# Patient Record
Sex: MALE | Race: WHITE | Employment: OTHER | URBAN - NONMETROPOLITAN AREA
[De-identification: names, ages, dates, MRNs, and addresses within clinical notes are randomized per-mention and may not be internally consistent; named-entity substitution may affect disease eponyms.]

---

## 2020-02-11 PROCEDURE — 64488 TAP BLOCK BI INJECTION: CPT | Performed by: NURSE ANESTHETIST, CERTIFIED REGISTERED

## 2021-02-10 ENCOUNTER — ANESTHESIA EVENT (OUTPATIENT)
Dept: OPERATING ROOM | Age: 61
DRG: 343 | End: 2021-02-10
Payer: COMMERCIAL

## 2021-02-10 ENCOUNTER — HOSPITAL ENCOUNTER (INPATIENT)
Age: 61
LOS: 2 days | Discharge: HOME | DRG: 343 | End: 2021-02-12
Attending: SURGERY | Admitting: SURGERY
Payer: COMMERCIAL

## 2021-02-10 ENCOUNTER — APPOINTMENT (OUTPATIENT)
Dept: CT IMAGING | Age: 61
DRG: 343 | End: 2021-02-10
Payer: COMMERCIAL

## 2021-02-10 DIAGNOSIS — G89.18 ACUTE POSTOPERATIVE PAIN: ICD-10-CM

## 2021-02-10 DIAGNOSIS — K35.80 ACUTE APPENDICITIS, UNSPECIFIED ACUTE APPENDICITIS TYPE: Primary | ICD-10-CM

## 2021-02-10 LAB
-: ABNORMAL
ABSOLUTE EOS #: 0.17 K/UL (ref 0–0.44)
ABSOLUTE IMMATURE GRANULOCYTE: 0.04 K/UL (ref 0–0.3)
ABSOLUTE LYMPH #: 1.87 K/UL (ref 1.1–3.7)
ABSOLUTE MONO #: 0.41 K/UL (ref 0.1–1.2)
ALBUMIN SERPL-MCNC: 4.8 G/DL (ref 3.5–5.2)
ALBUMIN/GLOBULIN RATIO: 1.7 (ref 1–2.5)
ALP BLD-CCNC: 48 U/L (ref 40–129)
ALT SERPL-CCNC: 22 U/L (ref 5–41)
AMORPHOUS: ABNORMAL
AMYLASE: 74 U/L (ref 28–100)
ANION GAP SERPL CALCULATED.3IONS-SCNC: 11 MMOL/L (ref 9–17)
AST SERPL-CCNC: 19 U/L
BACTERIA: ABNORMAL
BASOPHILS # BLD: 0 % (ref 0–2)
BASOPHILS ABSOLUTE: 0.04 K/UL (ref 0–0.2)
BILIRUB SERPL-MCNC: 0.46 MG/DL (ref 0.3–1.2)
BILIRUBIN URINE: NEGATIVE
BUN BLDV-MCNC: 19 MG/DL (ref 6–20)
BUN/CREAT BLD: 19 (ref 9–20)
CALCIUM SERPL-MCNC: 9.5 MG/DL (ref 8.6–10.4)
CASTS UA: ABNORMAL /LPF
CHLORIDE BLD-SCNC: 101 MMOL/L (ref 98–107)
CO2: 25 MMOL/L (ref 20–31)
COLOR: YELLOW
COMMENT UA: ABNORMAL
CREAT SERPL-MCNC: 1 MG/DL (ref 0.7–1.2)
CRYSTALS, UA: ABNORMAL /HPF
DIFFERENTIAL TYPE: ABNORMAL
EOSINOPHILS RELATIVE PERCENT: 1 % (ref 1–4)
EPITHELIAL CELLS UA: ABNORMAL /HPF (ref 0–5)
GFR AFRICAN AMERICAN: >60 ML/MIN
GFR NON-AFRICAN AMERICAN: >60 ML/MIN
GFR SERPL CREATININE-BSD FRML MDRD: ABNORMAL ML/MIN/{1.73_M2}
GFR SERPL CREATININE-BSD FRML MDRD: ABNORMAL ML/MIN/{1.73_M2}
GLUCOSE BLD-MCNC: 162 MG/DL (ref 70–99)
GLUCOSE URINE: NEGATIVE
HCT VFR BLD CALC: 47.2 % (ref 40.7–50.3)
HEMOGLOBIN: 15.8 G/DL (ref 13–17)
IMMATURE GRANULOCYTES: 0 %
KETONES, URINE: NEGATIVE
LACTIC ACID, WHOLE BLOOD: NORMAL MMOL/L (ref 0.7–2.1)
LACTIC ACID: 1.7 MMOL/L (ref 0.5–2.2)
LEUKOCYTE ESTERASE, URINE: NEGATIVE
LIPASE: 41 U/L (ref 13–60)
LYMPHOCYTES # BLD: 14 % (ref 24–43)
MCH RBC QN AUTO: 30 PG (ref 25.2–33.5)
MCHC RBC AUTO-ENTMCNC: 33.5 G/DL (ref 28.4–34.8)
MCV RBC AUTO: 89.7 FL (ref 82.6–102.9)
MONOCYTES # BLD: 3 % (ref 3–12)
MUCUS: ABNORMAL
NITRITE, URINE: NEGATIVE
NRBC AUTOMATED: 0 PER 100 WBC
OTHER OBSERVATIONS UA: ABNORMAL
PDW BLD-RTO: 13.7 % (ref 11.8–14.4)
PH UA: 5.5 (ref 5–9)
PLATELET # BLD: 175 K/UL (ref 138–453)
PLATELET ESTIMATE: ABNORMAL
PMV BLD AUTO: 9.9 FL (ref 8.1–13.5)
POTASSIUM SERPL-SCNC: 4.2 MMOL/L (ref 3.7–5.3)
PROTEIN UA: NEGATIVE
RBC # BLD: 5.26 M/UL (ref 4.21–5.77)
RBC # BLD: ABNORMAL 10*6/UL
RBC UA: ABNORMAL /HPF (ref 0–2)
RENAL EPITHELIAL, UA: ABNORMAL /HPF
SARS-COV-2, RAPID: NOT DETECTED
SARS-COV-2: NORMAL
SARS-COV-2: NORMAL
SEG NEUTROPHILS: 82 % (ref 36–65)
SEGMENTED NEUTROPHILS ABSOLUTE COUNT: 10.48 K/UL (ref 1.5–8.1)
SODIUM BLD-SCNC: 137 MMOL/L (ref 135–144)
SOURCE: NORMAL
SPECIFIC GRAVITY UA: 1.02 (ref 1.01–1.02)
TOTAL PROTEIN: 7.7 G/DL (ref 6.4–8.3)
TRICHOMONAS: ABNORMAL
TURBIDITY: CLEAR
URINE HGB: NEGATIVE
UROBILINOGEN, URINE: NORMAL
WBC # BLD: 13 K/UL (ref 3.5–11.3)
WBC # BLD: ABNORMAL 10*3/UL
WBC UA: ABNORMAL /HPF (ref 0–5)
YEAST: ABNORMAL

## 2021-02-10 PROCEDURE — 99222 1ST HOSP IP/OBS MODERATE 55: CPT | Performed by: SURGERY

## 2021-02-10 PROCEDURE — 80053 COMPREHEN METABOLIC PANEL: CPT

## 2021-02-10 PROCEDURE — 74177 CT ABD & PELVIS W/CONTRAST: CPT

## 2021-02-10 PROCEDURE — C9803 HOPD COVID-19 SPEC COLLECT: HCPCS

## 2021-02-10 PROCEDURE — 83605 ASSAY OF LACTIC ACID: CPT

## 2021-02-10 PROCEDURE — 81001 URINALYSIS AUTO W/SCOPE: CPT

## 2021-02-10 PROCEDURE — 0DTJ4ZZ RESECTION OF APPENDIX, PERCUTANEOUS ENDOSCOPIC APPROACH: ICD-10-PCS | Performed by: SURGERY

## 2021-02-10 PROCEDURE — 36415 COLL VENOUS BLD VENIPUNCTURE: CPT

## 2021-02-10 PROCEDURE — 8E0W4CZ ROBOTIC ASSISTED PROCEDURE OF TRUNK REGION, PERCUTANEOUS ENDOSCOPIC APPROACH: ICD-10-PCS | Performed by: SURGERY

## 2021-02-10 PROCEDURE — 6360000002 HC RX W HCPCS: Performed by: SURGERY

## 2021-02-10 PROCEDURE — 6360000004 HC RX CONTRAST MEDICATION: Performed by: NURSE PRACTITIONER

## 2021-02-10 PROCEDURE — 96365 THER/PROPH/DIAG IV INF INIT: CPT

## 2021-02-10 PROCEDURE — 85025 COMPLETE CBC W/AUTO DIFF WBC: CPT

## 2021-02-10 PROCEDURE — 1200000000 HC SEMI PRIVATE

## 2021-02-10 PROCEDURE — G0378 HOSPITAL OBSERVATION PER HR: HCPCS

## 2021-02-10 PROCEDURE — 83690 ASSAY OF LIPASE: CPT

## 2021-02-10 PROCEDURE — 2580000003 HC RX 258: Performed by: SURGERY

## 2021-02-10 PROCEDURE — 6360000002 HC RX W HCPCS: Performed by: NURSE PRACTITIONER

## 2021-02-10 PROCEDURE — 99285 EMERGENCY DEPT VISIT HI MDM: CPT

## 2021-02-10 PROCEDURE — 2580000003 HC RX 258: Performed by: NURSE PRACTITIONER

## 2021-02-10 PROCEDURE — U0002 COVID-19 LAB TEST NON-CDC: HCPCS

## 2021-02-10 PROCEDURE — 87086 URINE CULTURE/COLONY COUNT: CPT

## 2021-02-10 PROCEDURE — 96375 TX/PRO/DX INJ NEW DRUG ADDON: CPT

## 2021-02-10 PROCEDURE — 96376 TX/PRO/DX INJ SAME DRUG ADON: CPT

## 2021-02-10 PROCEDURE — 82150 ASSAY OF AMYLASE: CPT

## 2021-02-10 RX ORDER — SODIUM CHLORIDE 9 MG/ML
INJECTION, SOLUTION INTRAVENOUS CONTINUOUS
Status: DISCONTINUED | OUTPATIENT
Start: 2021-02-10 | End: 2021-02-11

## 2021-02-10 RX ORDER — KETOROLAC TROMETHAMINE 15 MG/ML
15 INJECTION, SOLUTION INTRAMUSCULAR; INTRAVENOUS 2 TIMES DAILY PRN
Status: DISCONTINUED | OUTPATIENT
Start: 2021-02-10 | End: 2021-02-11

## 2021-02-10 RX ORDER — ONDANSETRON 2 MG/ML
4 INJECTION INTRAMUSCULAR; INTRAVENOUS EVERY 6 HOURS PRN
Status: DISCONTINUED | OUTPATIENT
Start: 2021-02-10 | End: 2021-02-11

## 2021-02-10 RX ORDER — 0.9 % SODIUM CHLORIDE 0.9 %
1000 INTRAVENOUS SOLUTION INTRAVENOUS ONCE
Status: COMPLETED | OUTPATIENT
Start: 2021-02-10 | End: 2021-02-10

## 2021-02-10 RX ORDER — MORPHINE SULFATE 2 MG/ML
2 INJECTION, SOLUTION INTRAMUSCULAR; INTRAVENOUS
Status: DISCONTINUED | OUTPATIENT
Start: 2021-02-10 | End: 2021-02-11

## 2021-02-10 RX ORDER — ONDANSETRON 2 MG/ML
4 INJECTION INTRAMUSCULAR; INTRAVENOUS ONCE
Status: COMPLETED | OUTPATIENT
Start: 2021-02-10 | End: 2021-02-10

## 2021-02-10 RX ADMIN — ONDANSETRON 4 MG: 2 INJECTION INTRAMUSCULAR; INTRAVENOUS at 13:56

## 2021-02-10 RX ADMIN — SODIUM CHLORIDE: 9 INJECTION, SOLUTION INTRAVENOUS at 18:46

## 2021-02-10 RX ADMIN — PIPERACILLIN AND TAZOBACTAM 3375 MG: 3; .375 INJECTION, POWDER, FOR SOLUTION INTRAVENOUS at 23:34

## 2021-02-10 RX ADMIN — DEXTROSE MONOHYDRATE 4500 MG: 50 INJECTION, SOLUTION INTRAVENOUS at 15:45

## 2021-02-10 RX ADMIN — IOPAMIDOL 75 ML: 755 INJECTION, SOLUTION INTRAVENOUS at 14:27

## 2021-02-10 RX ADMIN — SODIUM CHLORIDE 1000 ML: 9 INJECTION, SOLUTION INTRAVENOUS at 13:56

## 2021-02-10 RX ADMIN — KETOROLAC TROMETHAMINE 15 MG: 15 INJECTION, SOLUTION INTRAMUSCULAR; INTRAVENOUS at 21:02

## 2021-02-10 ASSESSMENT — PAIN DESCRIPTION - LOCATION
LOCATION: ABDOMEN
LOCATION: ABDOMEN

## 2021-02-10 ASSESSMENT — PAIN SCALES - GENERAL
PAINLEVEL_OUTOF10: 7
PAINLEVEL_OUTOF10: 8
PAINLEVEL_OUTOF10: 3
PAINLEVEL_OUTOF10: 4

## 2021-02-10 ASSESSMENT — PAIN DESCRIPTION - ORIENTATION
ORIENTATION: RIGHT;LOWER

## 2021-02-10 ASSESSMENT — PAIN DESCRIPTION - FREQUENCY
FREQUENCY: CONTINUOUS
FREQUENCY: CONTINUOUS

## 2021-02-10 ASSESSMENT — PAIN DESCRIPTION - PAIN TYPE
TYPE: ACUTE PAIN
TYPE: ACUTE PAIN

## 2021-02-10 NOTE — ED NOTES
Pt ambulated to the bathroom and instructed on how to obtain a clean catch urine. Pt will take specimen back to room for collection and labeling. Pt will pull call light if assistance is needed.       Freeman Hurtado RN  02/10/21 4047

## 2021-02-10 NOTE — H&P
GENERAL SURGERY H&P- Inpatient      Patient's Name/ Date of Birth/ Gender: Susannah Marquez / 1960 (62 y.o.) / male     PCP: Pedro Corea MD  Referring: ER    History of present Illness:  Patient is a pleasant 62 y.o. male   ER admission. Acute appendicitis    Past Medical History:  has a past medical history of Depression, Diabetes mellitus ([address] Hyperlipidemia, and Hypertension. Past Surgical History:   Past Surgical History:   Procedure Laterality Date    TONSILLECTOMY      WISDOM TOOTH EXTRACTION         Social History:  reports that he quit smoking about 4 years ago. His smoking use included cigarettes. He has a 5.00 pack-year smoking history. He has never used smokeless tobacco. He reports that he does not drink alcohol or use drugs. Family History: family history includes Heart Disease in his father.     Review of Systems:   General: Completed and, except as mentioned above, was negative or noncontributory  Psychological:  Completed and, except as mentioned above, was negative or noncontributory  Ophthalmic:  Completed and, except as mentioned above, was negative or noncontributory  ENT:  Completed and, except as mentioned above, was negative or noncontributory  Allergy and Immunology:  Completed and, except as mentioned above, was negative or noncontributory  Hematological and Lymphatic:  Completed and, except as mentioned above, was negative or noncontributory  Endocrine: Completed and, except as mentioned above, was negative or noncontributory  Breast:  Completed and, except as mentioned above, was negative or noncontributory  Respiratory:  Completed and, except as mentioned above, was negative or noncontributory  Cardiovascular:  Completed and, except as mentioned above, was negative or noncontributory  Gastrointestinal: Completed and, except as mentioned above, was negative or noncontributory  Genito-Urinary:  Completed and, except as mentioned above, was negative or cooperative. HEENT: PERRLA, EOMI, no scleral icterus  Neck:  no goiter  CVS: Regular rate and rhythm  Resp: Good bilateral air entry, no active wheezing, no labored breathing  Abd: soft, + localized RLQ peritonitis, + rebound, + guarding  Ext: Moves all extremities, no gross focal motor deficits  Skin: No erythema or ulcerations     Labs:   Lab Results   Component Value Date    WBC 13.0 02/10/2021    HGB 15.8 02/10/2021    HCT 47.2 02/10/2021    MCV 89.7 02/10/2021     02/10/2021     Lab Results   Component Value Date     02/10/2021    K 4.2 02/10/2021     02/10/2021    CO2 25 02/10/2021    BUN 19 02/10/2021    CREATININE 1.00 02/10/2021    GLUCOSE 162 02/10/2021    GLUCOSE 135 12/16/2020    CALCIUM 9.5 02/10/2021     Lab Results   Component Value Date    ALKPHOS 48 02/10/2021    ALT 22 02/10/2021    AST 19 02/10/2021    PROT 7.7 02/10/2021    BILITOT 0.46 02/10/2021    LABALBU 4.8 02/10/2021     Lab Results   Component Value Date    AMYLASE 74 02/10/2021     Lab Results   Component Value Date    LIPASE 41 02/10/2021     No results found for: INR    Radiologic Studies:  EXAMINATION:   CT OF THE ABDOMEN AND PELVIS WITH CONTRAST 2/10/2021 11:21 am       TECHNIQUE:   CT of the abdomen and pelvis was performed with the administration of   intravenous contrast. Multiplanar reformatted images are provided for review.    Dose modulation, iterative reconstruction, and/or weight based adjustment of   the mA/kV was utilized to reduce the radiation dose to as low as reasonably   achievable.       COMPARISON:   None.       HISTORY:   ORDERING SYSTEM PROVIDED HISTORY: RLQ abd pain   TECHNOLOGIST PROVIDED HISTORY:   RLQ abd pain           FINDINGS:   Lower Chest: Mild scarring inferior segment lingula.  Trace dependent   changes.  No pleural or pericardial effusion.  Tiny hiatal hernia.       Organs:  Liver enhances normally without evidence of intrahepatic biliary   ductal dilatation.  Cholecystectomy. Santhosh Castaneda is within normal limits with a   small splenule anteriorly.  Pancreas, adrenal glands, and kidneys are within   normal limits.       GI/Bowel: Enlarged appendix measuring 10 mm with julianna appendiceal   inflammatory change.  No appendicoliths and no surrounding free air or   organized periappendiceal fluid collections.  Associated secondary   inflammatory thickening of the tip of the cecum. There is diverticulosis   without evidence of diverticulitis.       Pelvis: Urinary bladder and male pelvic organs are grossly unremarkable.       Peritoneum/Retroperitoneum: Julianna appendiceal and right paracolic gutter   inflammatory fat stranding.  Small volume of complex free fluid in the   dependent pelvis.       Bones/Soft Tissues: No acute abnormality in the superficial soft tissues or   bones on a background of mild degenerative changes.           Impression   Acute appendicitis.  No organized periappendiceal collections or free air.       Secondary inflammatory thickening of the tip of the cecum.       Inflammatory changes along the right paracolic gutter with a trace volume of   complex free fluid in the dependent pelvis. Impressions/Recommendations:     Acute appendicitis with localized peritonitis. IV antibiotics. NPO. Scheduled for surgery in am.   Informed consent/risks benefits discussed for robotic/laparoscopic appendectomy, possible open, possible, drain. Wife present. H&P  General Surgery        Pt Name: Benny Lauren  MRN: [id number]  YOB: 1960  Date of evaluation: 2/11/2021      [x] I have examined the patient and reviewed the H&P/Consult completed, and there are no changes to the patient or plans. [] I have examined the patient and reviewed the H&P/Consult and have noted the following changes: The patient was counseled at length about the risks of beverly Covid-19 during their perioperative period and any recovery window from their procedure.   The patient was made aware that beverly Covid-19  may worsen their prognosis for recovering from their procedure  and lend to a higher morbidity and/or mortality risk. All material risks, benefits, and reasonable alternatives including postponing the procedure were discussed. The patient does wish to proceed with the procedure at this time.         Electronically signed by Favian Muniz DO  on 2/11/2021 at 9:05 AM

## 2021-02-10 NOTE — PROGRESS NOTES
Pt admitted from ER d/t RLQ pain. Transported by wheelchair, accompanied by wife. Sharon Hwang RN, gives report. Pt is A&Ox4, calm and cooperative, rates pain 4/10 to RLQ and lower abdomen/umbilical area. Surgery planned for tomorrow. Admission assmt to be completed. Will continue to monitor.

## 2021-02-10 NOTE — ED PROVIDER NOTES
[facility] ED  EMERGENCY DEPARTMENT ENCOUNTER      Pt Name: Kathrine White  MRN: [id number]  [place] 1960  Date of evaluation: 2/10/2021  Provider: SANDY Bartlett [id number]       Chief Complaint   Patient presents with    Abdominal Pain     RLQ and mid lower abd pain, onset this am at 0930         HISTORY OF PRESENT ILLNESS   (Location/Symptom, Timing/Onset, Context/Setting, Quality, Duration, Modifying Factors, Severity)  Note limiting factors. Kathrine White is a 62 y.o. male who presents to the emergency department for a complaint of right lower quadrant abdominal pain to the mid abdominal pain. The patient reports that his pain started this morning at approximately 9:30 AM, 4 hours ago. He rates his pain an 8 out of a 10 on the pain scale. He states he is concerned about appendicitis. He denies fevers or chills. He denies any nausea or vomiting but reports he does have slightly loose stools but has had this since a recent diet change. Nursing Notes were reviewed. REVIEW OF SYSTEMS    (2-9 systems for level 4, 10 or more for level 5)   Constitutional: Negative for fever, chills  Skin: Negative for rash, itching  HENT: Negative for sore throat, rhinorrhea and sinus pain. Eyes: Negative for eye discharge, eye redness  Cardiovascular: Negative for chest pain, dyspnea on exertion  Respiratory: Negative for cough, shortness of breath, wheezing or stridor. Gastrointestinal: See HPI  Musculoskeletal: Negative for myalgias, back pain, falls. Neurological: Negative for tingling, headaches. Except as noted above the remainder of the review of systems was reviewed and negative.        PAST MEDICAL HISTORY     Past Medical History:   Diagnosis Date    Depression     Diabetes mellitus ([address]     Hyperlipidemia     Hypertension          SURGICAL HISTORY       Past Surgical History:   Procedure Laterality Date    TONSILLECTOMY           CURRENT MEDICATIONS Previous Medications    FENOFIBRATE (TRIGLIDE) 160 MG TABLET    Take 1 tablet by mouth daily    GLUCOSE BLOOD VI TEST STRIPS (ASCENSIA AUTODISC VI;ONE TOUCH ULTRA TEST VI) STRIP    1 each by In Vitro route 2 times daily. Pt uses one touch ultra test strips/ pt tests bid/ disp: 3 month supply /Dx: 250.00/272.2/401.1    HYDROCHLOROTHIAZIDE (MICROZIDE) 12.5 MG CAPSULE    Take 1 capsule by mouth every morning    LOSARTAN (COZAAR) 100 MG TABLET    Take 1 tablet by mouth daily    METFORMIN (GLUCOPHAGE) 1000 MG TABLET    Take 1 tablet by mouth 2 times daily (with meals)    PRAVASTATIN (PRAVACHOL) 40 MG TABLET    Take 1 tablet by mouth daily       ALLERGIES     Patient has no known allergies.     FAMILY HISTORY       Family History   Problem Relation Age of Onset    Heart Disease Father           SOCIAL HISTORY       Social History     Socioeconomic History    Marital status:      Spouse name: None    Number of children: None    Years of education: None    Highest education level: None   Occupational History    None   Social Needs    Financial resource strain: Not hard at all   [employer] insecurity     Worry: Never true     Inability: Never true    Transportation needs     Medical: No     Non-medical: No   Tobacco Use    Smoking status: Former Smoker     Packs/day: 0.50     Years: 10.00     Pack years: 5.00     Types: Cigarettes     Quit date: 10/1/2016     Years since quittin.3    Smokeless tobacco: Never Used   Substance and Sexual Activity    Alcohol use: No     Alcohol/week: 0.0 standard drinks    Drug use: None    Sexual activity: None   Lifestyle    Physical activity     Days per week: None     Minutes per session: None    Stress: None   Relationships    Social connections     Talks on phone: None     Gets together: None     Attends [employer] service: None     Active member of club or organization: None     Attends meetings of clubs or organizations: None     Relationship status: None    Intimate partner violence     Fear of current or ex partner: None     Emotionally abused: None     Physically abused: None     Forced sexual activity: None   Other Topics Concern    None   Social History Narrative    None       PHYSICAL EXAM    (up to 7 for level 4, 8 or more for level 5)     ED Triage Vitals   BP Temp Temp Source Pulse Resp SpO2 Height Weight   02/10/21 1254 02/10/21 1305 02/10/21 1305 -- 02/10/21 1305 02/10/21 1255 -- --   (!) 152/66 97.5 °F (36.4 °C) Tympanic  16 98 %       Constitutional: Oriented and well-developed, well-nourished, and in no distress. Non-toxic appearance. Head: Normocephalic and atraumatic. Eyes: Extra ocular muscles intact. Pupils are equal, round, and reactive to light. No discharge. No scleral icterus. Neck: Normal range of motion and phonation normal. Neck supple. No JVD present. No spinous process tenderness and no muscular tenderness present. No cervical adenopathy. Cardiovascular: Regular rate and rhythm, normal heart sounds and intact distal pulses. No murmur heard. Pulmonary/Chest: Effort normal and breath sounds normal. No accessory muscle usage or stridor. Not tachypneic. No respiratory distress. No tenderness and no retraction. Abdominal: Soft and non-distended. Bowel sounds are normal. No masses or organomegaly. Mild tenderness without guarding or rebound noted in the right lower quadrant. Negative heel strike. Musculoskeletal: Normal range of motion. No edema and no tenderness. Neurological: Alert and oriented. Skin: Skin is warm and dry. No rash noted. No cyanosis or erythema. No pallor. Nails show no clubbing.      DIAGNOSTIC RESULTS     EKG: All EKG's are interpreted by the Emergency Department Physician who either signs or Co-signs this chart in the absence of a cardiologist.    RADIOLOGY:   Non-plain film images such as CT, Ultrasound and MRI are read by the radiologist. Plain radiographic images are visualized and preliminarily interpreted by the emergency physician with the below findings:    Interpretation per the Radiologist below, if available at the time of this note:    CT ABDOMEN PELVIS W IV CONTRAST Additional Contrast? None   Final Result   Acute appendicitis. No organized periappendiceal collections or free air. Secondary inflammatory thickening of the tip of the cecum. Inflammatory changes along the right paracolic gutter with a trace volume of   complex free fluid in the dependent pelvis.                ED BEDSIDE ULTRASOUND:   Performed by ED Physician - none    LABS:  Results for orders placed or performed during the hospital encounter of 02/10/21   CBC Auto Differential   Result Value Ref Range    WBC 13.0 (H) 3.5 - 11.3 k/uL    RBC 5.26 4.21 - 5.77 m/uL    Hemoglobin 15.8 13.0 - 17.0 g/dL    Hematocrit 47.2 40.7 - 50.3 %    MCV 89.7 82.6 - 102.9 fL    MCH 30.0 25.2 - 33.5 pg    MCHC 33.5 28.4 - 34.8 g/dL    RDW 13.7 11.8 - 14.4 %    Platelets 413 243 - 120 k/uL    MPV 9.9 8.1 - 13.5 fL    NRBC Automated 0.0 0.0 per 100 WBC    Differential Type NOT REPORTED     Seg Neutrophils 82 (H) 36 - 65 %    Lymphocytes 14 (L) 24 - 43 %    Monocytes 3 3 - 12 %    Eosinophils % 1 1 - 4 %    Basophils 0 0 - 2 %    Immature Granulocytes 0 0 %    Segs Absolute 10.48 (H) 1.50 - 8.10 k/uL    Absolute Lymph # 1.87 1.10 - 3.70 k/uL    Absolute Mono # 0.41 0.10 - 1.20 k/uL    Absolute Eos # 0.17 0.00 - 0.44 k/uL    Basophils Absolute 0.04 0.00 - 0.20 k/uL    Absolute Immature Granulocyte 0.04 0.00 - 0.30 k/uL    WBC Morphology NOT REPORTED     RBC Morphology NOT REPORTED     Platelet Estimate NOT REPORTED    Comprehensive Metabolic Panel   Result Value Ref Range    Glucose 162 (H) 70 - 99 mg/dL    BUN 19 6 - 20 mg/dL    CREATININE 1.00 0.70 - 1.20 mg/dL    Bun/Cre Ratio 19 9 - 20    Calcium 9.5 8.6 - 10.4 mg/dL    Sodium 137 135 - 144 mmol/L    Potassium 4.2 3.7 - 5.3 mmol/L    Chloride 101 98 - 107 mmol/L    CO2 25 20 - 31 mmol/L Anion Gap 11 9 - 17 mmol/L    Alkaline Phosphatase 48 40 - 129 U/L    ALT 22 5 - 41 U/L    AST 19 <40 U/L    Total Bilirubin 0.46 0.3 - 1.2 mg/dL    Total Protein 7.7 6.4 - 8.3 g/dL    Albumin 4.8 3.5 - 5.2 g/dL    Albumin/Globulin Ratio 1.7 1.0 - 2.5    GFR Non-African American >60 >60 mL/min    GFR African American >60 >60 mL/min    GFR Comment          GFR Staging         Lactic Acid, Plasma   Result Value Ref Range    Lactic Acid 1.7 0.5 - 2.2 mmol/L    Lactic Acid, Whole Blood NOT REPORTED 0.7 - 2.1 mmol/L   Lipase   Result Value Ref Range    Lipase 41 13 - 60 U/L   Urinalysis with Microscopic   Result Value Ref Range    Color, UA YELLOW YELLOW    Turbidity UA CLEAR CLEAR    Glucose, Ur NEGATIVE NEGATIVE    Bilirubin Urine NEGATIVE NEGATIVE    Ketones, Urine NEGATIVE NEGATIVE    Specific Gravity, UA 1.020 1.010 - 1.020    Urine Hgb NEGATIVE NEGATIVE    pH, UA 5.5 5.0 - 9.0    Protein, UA NEGATIVE NEGATIVE    Urobilinogen, Urine Normal Normal    Nitrite, Urine NEGATIVE NEGATIVE    Leukocyte Esterase, Urine NEGATIVE NEGATIVE    Urinalysis Comments NOT REPORTED     -          WBC, UA 0 TO 2 0 - 5 /HPF    RBC, UA None 0 - 2 /HPF    Casts UA NOT REPORTED /LPF    Crystals, UA NOT REPORTED None /HPF    Epithelial Cells UA 0 TO 2 0 - 5 /HPF    Renal Epithelial, UA NOT REPORTED 0 /HPF    Bacteria, UA NOT REPORTED None    Mucus, UA TRACE (A) None    Trichomonas, UA NOT REPORTED None    Amorphous, UA NOT REPORTED None    Other Observations UA NOT REPORTED NOT REQ.     Yeast, UA NOT REPORTED None   Amylase   Result Value Ref Range    Amylase 74 28 - 100 U/L     Orders Placed This Encounter   Procedures    Culture, Urine    CT ABDOMEN PELVIS W IV CONTRAST Additional Contrast? None    CBC Auto Differential    Comprehensive Metabolic Panel    Lactic Acid, Plasma    Lipase    Urinalysis with Microscopic    Amylase    Insert peripheral IV       All other labs were within normal range or not returned as of this dictation. EMERGENCY DEPARTMENT COURSE and DIFFERENTIAL DIAGNOSIS/MDM:   Vitals:    Vitals:    02/10/21 1254 02/10/21 1255 02/10/21 1305   BP: (!) 152/66     Resp:   16   Temp:   97.5 °F (36.4 °C)   TempSrc:   Tympanic   SpO2:  98%        MEDICAL DECISION MAKING:  Patient was admitted to general surgery, Dr. Jayden Muniz. He was started on antibiotics and general surgery stated that she would remove the appendix in the morning. The patient was evaluated during the global COVID-19 pandemic, and that diagnosis was considered upon their initial presentation. Their evaluation, treatment and testing was consistent with current guidelines for patients who present with complaints or symptoms that may be related to COVID-19. Full PPE including gloves, gown, N95 or P100 respirator, and eye protection was used during every encounter with this patient. CONSULTS:  Dr. Jayden Muniz - General Surgery -was notified about the acute appendicitis finding and she stated that the patient could be placed on antibiotics and she would remove the appendix in the morning first thing. She agreed to admit to her service. FINAL IMPRESSION      1. Acute appendicitis, unspecified acute appendicitis type Stable         DISPOSITION/PLAN   DISPOSITION    Admit    PATIENT REFERRED TO:  No follow-up provider specified. DISCHARGE MEDICATIONS:  New Prescriptions    No medications on file     Controlled Substances Monitoring:     No flowsheet data found.     (Please note that portions of this note were completed with a voice recognition program.  Efforts were made to edit the dictations but occasionally words are mis-transcribed.)    Electronically signed by SANDY Contreras CNP on 2/10/2021 at 3:12 PM               SANDY Contreras CNP  02/10/21 8075

## 2021-02-11 ENCOUNTER — ANESTHESIA (OUTPATIENT)
Dept: OPERATING ROOM | Age: 61
DRG: 343 | End: 2021-02-11
Payer: COMMERCIAL

## 2021-02-11 VITALS — SYSTOLIC BLOOD PRESSURE: 113 MMHG | DIASTOLIC BLOOD PRESSURE: 40 MMHG | OXYGEN SATURATION: 99 %

## 2021-02-11 LAB
CULTURE: NORMAL
GLUCOSE BLD-MCNC: 140 MG/DL (ref 74–100)
Lab: NORMAL
SPECIMEN DESCRIPTION: NORMAL

## 2021-02-11 PROCEDURE — 2500000003 HC RX 250 WO HCPCS: Performed by: NURSE ANESTHETIST, CERTIFIED REGISTERED

## 2021-02-11 PROCEDURE — 6360000002 HC RX W HCPCS: Performed by: NURSE ANESTHETIST, CERTIFIED REGISTERED

## 2021-02-11 PROCEDURE — 3700000000 HC ANESTHESIA ATTENDED CARE: Performed by: SURGERY

## 2021-02-11 PROCEDURE — 3600000019 HC SURGERY ROBOT ADDTL 15MIN: Performed by: SURGERY

## 2021-02-11 PROCEDURE — 7100000000 HC PACU RECOVERY - FIRST 15 MIN: Performed by: SURGERY

## 2021-02-11 PROCEDURE — 6360000002 HC RX W HCPCS: Performed by: SURGERY

## 2021-02-11 PROCEDURE — 3600000009 HC SURGERY ROBOT BASE: Performed by: SURGERY

## 2021-02-11 PROCEDURE — 3700000001 HC ADD 15 MINUTES (ANESTHESIA): Performed by: SURGERY

## 2021-02-11 PROCEDURE — G0378 HOSPITAL OBSERVATION PER HR: HCPCS

## 2021-02-11 PROCEDURE — 1200000000 HC SEMI PRIVATE

## 2021-02-11 PROCEDURE — 6370000000 HC RX 637 (ALT 250 FOR IP): Performed by: SURGERY

## 2021-02-11 PROCEDURE — 2580000003 HC RX 258: Performed by: SURGERY

## 2021-02-11 PROCEDURE — 2580000003 HC RX 258: Performed by: NURSE ANESTHETIST, CERTIFIED REGISTERED

## 2021-02-11 PROCEDURE — 7100000001 HC PACU RECOVERY - ADDTL 15 MIN: Performed by: SURGERY

## 2021-02-11 PROCEDURE — 64488 TAP BLOCK BI INJECTION: CPT | Performed by: NURSE ANESTHETIST, CERTIFIED REGISTERED

## 2021-02-11 PROCEDURE — 2709999900 HC NON-CHARGEABLE SUPPLY: Performed by: SURGERY

## 2021-02-11 PROCEDURE — 2720000010 HC SURG SUPPLY STERILE: Performed by: SURGERY

## 2021-02-11 PROCEDURE — S2900 ROBOTIC SURGICAL SYSTEM: HCPCS | Performed by: SURGERY

## 2021-02-11 PROCEDURE — 44970 LAPAROSCOPY APPENDECTOMY: CPT | Performed by: SURGERY

## 2021-02-11 PROCEDURE — 88304 TISSUE EXAM BY PATHOLOGIST: CPT

## 2021-02-11 PROCEDURE — 82947 ASSAY GLUCOSE BLOOD QUANT: CPT

## 2021-02-11 RX ORDER — ROCURONIUM BROMIDE 10 MG/ML
INJECTION, SOLUTION INTRAVENOUS PRN
Status: DISCONTINUED | OUTPATIENT
Start: 2021-02-11 | End: 2021-02-11 | Stop reason: SDUPTHER

## 2021-02-11 RX ORDER — HYDROCHLOROTHIAZIDE 12.5 MG/1
12.5 CAPSULE, GELATIN COATED ORAL EVERY MORNING
Status: DISCONTINUED | OUTPATIENT
Start: 2021-02-11 | End: 2021-02-12 | Stop reason: HOSPADM

## 2021-02-11 RX ORDER — FENTANYL CITRATE 50 UG/ML
INJECTION, SOLUTION INTRAMUSCULAR; INTRAVENOUS PRN
Status: DISCONTINUED | OUTPATIENT
Start: 2021-02-11 | End: 2021-02-11 | Stop reason: SDUPTHER

## 2021-02-11 RX ORDER — MORPHINE SULFATE 2 MG/ML
2 INJECTION, SOLUTION INTRAMUSCULAR; INTRAVENOUS
Status: DISCONTINUED | OUTPATIENT
Start: 2021-02-11 | End: 2021-02-12 | Stop reason: HOSPADM

## 2021-02-11 RX ORDER — METOCLOPRAMIDE HYDROCHLORIDE 5 MG/ML
10 INJECTION INTRAMUSCULAR; INTRAVENOUS
Status: DISCONTINUED | OUTPATIENT
Start: 2021-02-11 | End: 2021-02-11 | Stop reason: HOSPADM

## 2021-02-11 RX ORDER — KETOROLAC TROMETHAMINE 30 MG/ML
INJECTION, SOLUTION INTRAMUSCULAR; INTRAVENOUS PRN
Status: DISCONTINUED | OUTPATIENT
Start: 2021-02-11 | End: 2021-02-11 | Stop reason: SDUPTHER

## 2021-02-11 RX ORDER — ONDANSETRON 4 MG/1
4 TABLET, FILM COATED ORAL EVERY 8 HOURS PRN
Qty: 15 TABLET | Refills: 0 | Status: SHIPPED | OUTPATIENT
Start: 2021-02-11 | End: 2022-01-11

## 2021-02-11 RX ORDER — ROPIVACAINE HYDROCHLORIDE 5 MG/ML
INJECTION, SOLUTION EPIDURAL; INFILTRATION; PERINEURAL PRN
Status: DISCONTINUED | OUTPATIENT
Start: 2021-02-11 | End: 2021-02-11 | Stop reason: SDUPTHER

## 2021-02-11 RX ORDER — NEOSTIGMINE METHYLSULFATE 1 MG/ML
INJECTION, SOLUTION INTRAVENOUS PRN
Status: DISCONTINUED | OUTPATIENT
Start: 2021-02-11 | End: 2021-02-11 | Stop reason: SDUPTHER

## 2021-02-11 RX ORDER — LOSARTAN POTASSIUM 50 MG/1
100 TABLET ORAL DAILY
Status: DISCONTINUED | OUTPATIENT
Start: 2021-02-11 | End: 2021-02-12 | Stop reason: HOSPADM

## 2021-02-11 RX ORDER — SODIUM CHLORIDE 9 MG/ML
INJECTION INTRAVENOUS PRN
Status: DISCONTINUED | OUTPATIENT
Start: 2021-02-11 | End: 2021-02-11 | Stop reason: SDUPTHER

## 2021-02-11 RX ORDER — FENTANYL CITRATE 50 UG/ML
25 INJECTION, SOLUTION INTRAMUSCULAR; INTRAVENOUS EVERY 5 MIN PRN
Status: DISCONTINUED | OUTPATIENT
Start: 2021-02-11 | End: 2021-02-11 | Stop reason: HOSPADM

## 2021-02-11 RX ORDER — SUCCINYLCHOLINE/SOD CL,ISO/PF 100 MG/5ML
SYRINGE (ML) INTRAVENOUS PRN
Status: DISCONTINUED | OUTPATIENT
Start: 2021-02-11 | End: 2021-02-11 | Stop reason: SDUPTHER

## 2021-02-11 RX ORDER — PROPOFOL 10 MG/ML
INJECTION, EMULSION INTRAVENOUS PRN
Status: DISCONTINUED | OUTPATIENT
Start: 2021-02-11 | End: 2021-02-11 | Stop reason: SDUPTHER

## 2021-02-11 RX ORDER — ASPIRIN 81 MG/1
81 TABLET ORAL DAILY
Status: DISCONTINUED | OUTPATIENT
Start: 2021-02-11 | End: 2021-02-12 | Stop reason: HOSPADM

## 2021-02-11 RX ORDER — HYDROCODONE BITARTRATE AND ACETAMINOPHEN 5; 325 MG/1; MG/1
1 TABLET ORAL EVERY 6 HOURS PRN
Qty: 16 TABLET | Refills: 0 | Status: SHIPPED | OUTPATIENT
Start: 2021-02-11 | End: 2021-02-15

## 2021-02-11 RX ORDER — ONDANSETRON 2 MG/ML
4 INJECTION INTRAMUSCULAR; INTRAVENOUS
Status: DISCONTINUED | OUTPATIENT
Start: 2021-02-11 | End: 2021-02-11 | Stop reason: HOSPADM

## 2021-02-11 RX ORDER — SODIUM CHLORIDE 9 MG/ML
INJECTION, SOLUTION INTRAVENOUS CONTINUOUS
Status: DISCONTINUED | OUTPATIENT
Start: 2021-02-11 | End: 2021-02-12 | Stop reason: HOSPADM

## 2021-02-11 RX ORDER — DEXAMETHASONE SODIUM PHOSPHATE 10 MG/ML
INJECTION, SOLUTION INTRAMUSCULAR; INTRAVENOUS PRN
Status: DISCONTINUED | OUTPATIENT
Start: 2021-02-11 | End: 2021-02-11 | Stop reason: SDUPTHER

## 2021-02-11 RX ORDER — PRAVASTATIN SODIUM 20 MG
40 TABLET ORAL DAILY
Status: DISCONTINUED | OUTPATIENT
Start: 2021-02-11 | End: 2021-02-12 | Stop reason: HOSPADM

## 2021-02-11 RX ORDER — SODIUM CHLORIDE, SODIUM LACTATE, POTASSIUM CHLORIDE, CALCIUM CHLORIDE 600; 310; 30; 20 MG/100ML; MG/100ML; MG/100ML; MG/100ML
INJECTION, SOLUTION INTRAVENOUS CONTINUOUS PRN
Status: DISCONTINUED | OUTPATIENT
Start: 2021-02-11 | End: 2021-02-11 | Stop reason: SDUPTHER

## 2021-02-11 RX ORDER — LIDOCAINE HYDROCHLORIDE 20 MG/ML
INJECTION, SOLUTION EPIDURAL; INFILTRATION; INTRACAUDAL; PERINEURAL PRN
Status: DISCONTINUED | OUTPATIENT
Start: 2021-02-11 | End: 2021-02-11 | Stop reason: SDUPTHER

## 2021-02-11 RX ORDER — MIDAZOLAM HYDROCHLORIDE 1 MG/ML
INJECTION INTRAMUSCULAR; INTRAVENOUS PRN
Status: DISCONTINUED | OUTPATIENT
Start: 2021-02-11 | End: 2021-02-11 | Stop reason: SDUPTHER

## 2021-02-11 RX ORDER — GLYCOPYRROLATE 1 MG/5 ML
SYRINGE (ML) INTRAVENOUS PRN
Status: DISCONTINUED | OUTPATIENT
Start: 2021-02-11 | End: 2021-02-11 | Stop reason: SDUPTHER

## 2021-02-11 RX ORDER — ONDANSETRON 2 MG/ML
INJECTION INTRAMUSCULAR; INTRAVENOUS PRN
Status: DISCONTINUED | OUTPATIENT
Start: 2021-02-11 | End: 2021-02-11 | Stop reason: SDUPTHER

## 2021-02-11 RX ORDER — ONDANSETRON 2 MG/ML
4 INJECTION INTRAMUSCULAR; INTRAVENOUS EVERY 6 HOURS PRN
Status: DISCONTINUED | OUTPATIENT
Start: 2021-02-11 | End: 2021-02-12 | Stop reason: HOSPADM

## 2021-02-11 RX ORDER — FENTANYL CITRATE 50 UG/ML
50 INJECTION, SOLUTION INTRAMUSCULAR; INTRAVENOUS EVERY 5 MIN PRN
Status: DISCONTINUED | OUTPATIENT
Start: 2021-02-11 | End: 2021-02-11 | Stop reason: HOSPADM

## 2021-02-11 RX ORDER — FENOFIBRATE 160 MG/1
160 TABLET ORAL DAILY
Status: DISCONTINUED | OUTPATIENT
Start: 2021-02-11 | End: 2021-02-12 | Stop reason: HOSPADM

## 2021-02-11 RX ORDER — KETOROLAC TROMETHAMINE 15 MG/ML
15 INJECTION, SOLUTION INTRAMUSCULAR; INTRAVENOUS 2 TIMES DAILY PRN
Status: DISCONTINUED | OUTPATIENT
Start: 2021-02-11 | End: 2021-02-12 | Stop reason: HOSPADM

## 2021-02-11 RX ORDER — ASPIRIN 81 MG/1
81 TABLET ORAL DAILY
COMMUNITY

## 2021-02-11 RX ADMIN — PRAVASTATIN SODIUM 40 MG: 20 TABLET ORAL at 20:43

## 2021-02-11 RX ADMIN — SODIUM CHLORIDE: 9 INJECTION, SOLUTION INTRAVENOUS at 21:59

## 2021-02-11 RX ADMIN — ONDANSETRON 4 MG: 2 INJECTION INTRAMUSCULAR; INTRAVENOUS at 10:06

## 2021-02-11 RX ADMIN — MIDAZOLAM 2 MG: 1 INJECTION INTRAMUSCULAR; INTRAVENOUS at 08:58

## 2021-02-11 RX ADMIN — ROCURONIUM BROMIDE 40 MG: 10 INJECTION, SOLUTION INTRAVENOUS at 09:27

## 2021-02-11 RX ADMIN — ROCURONIUM BROMIDE 10 MG: 10 INJECTION, SOLUTION INTRAVENOUS at 09:19

## 2021-02-11 RX ADMIN — FENTANYL CITRATE 50 MCG: 50 INJECTION, SOLUTION INTRAMUSCULAR; INTRAVENOUS at 09:17

## 2021-02-11 RX ADMIN — Medication 100 MG: at 09:20

## 2021-02-11 RX ADMIN — KETOROLAC TROMETHAMINE 15 MG: 15 INJECTION, SOLUTION INTRAMUSCULAR; INTRAVENOUS at 14:03

## 2021-02-11 RX ADMIN — LIDOCAINE HYDROCHLORIDE 40 MG: 20 INJECTION, SOLUTION EPIDURAL; INFILTRATION; INTRACAUDAL; PERINEURAL at 09:19

## 2021-02-11 RX ADMIN — PIPERACILLIN AND TAZOBACTAM 3375 MG: 3; .375 INJECTION, POWDER, FOR SOLUTION INTRAVENOUS at 16:44

## 2021-02-11 RX ADMIN — SODIUM CHLORIDE, POTASSIUM CHLORIDE, SODIUM LACTATE AND CALCIUM CHLORIDE: 600; 310; 30; 20 INJECTION, SOLUTION INTRAVENOUS at 09:32

## 2021-02-11 RX ADMIN — FENOFIBRATE 160 MG: 160 TABLET ORAL at 20:42

## 2021-02-11 RX ADMIN — PIPERACILLIN AND TAZOBACTAM 3375 MG: 3; .375 INJECTION, POWDER, FOR SOLUTION INTRAVENOUS at 08:03

## 2021-02-11 RX ADMIN — SODIUM CHLORIDE 40 ML: 9 INJECTION, SOLUTION INTRAMUSCULAR; INTRAVENOUS; SUBCUTANEOUS at 09:04

## 2021-02-11 RX ADMIN — SODIUM CHLORIDE: 9 INJECTION, SOLUTION INTRAVENOUS at 02:11

## 2021-02-11 RX ADMIN — ASPIRIN 81 MG: 81 TABLET, COATED ORAL at 11:58

## 2021-02-11 RX ADMIN — KETOROLAC TROMETHAMINE 30 MG: 30 INJECTION, SOLUTION INTRAMUSCULAR; INTRAVENOUS at 10:10

## 2021-02-11 RX ADMIN — Medication 5 MG: at 10:17

## 2021-02-11 RX ADMIN — ROPIVACAINE HYDROCHLORIDE 50 ML: 5 INJECTION, SOLUTION EPIDURAL; INFILTRATION; PERINEURAL at 09:04

## 2021-02-11 RX ADMIN — Medication 0.6 MG: at 10:17

## 2021-02-11 RX ADMIN — SODIUM CHLORIDE: 9 INJECTION, SOLUTION INTRAVENOUS at 11:25

## 2021-02-11 RX ADMIN — KETOROLAC TROMETHAMINE 15 MG: 15 INJECTION, SOLUTION INTRAMUSCULAR; INTRAVENOUS at 19:19

## 2021-02-11 RX ADMIN — METFORMIN HYDROCHLORIDE 1000 MG: 500 TABLET ORAL at 16:43

## 2021-02-11 RX ADMIN — PROPOFOL 200 MG: 10 INJECTION, EMULSION INTRAVENOUS at 09:19

## 2021-02-11 RX ADMIN — FAMOTIDINE 20 MG: 10 INJECTION, SOLUTION INTRAVENOUS at 09:11

## 2021-02-11 RX ADMIN — METFORMIN HYDROCHLORIDE 1000 MG: 500 TABLET ORAL at 11:58

## 2021-02-11 RX ADMIN — DEXAMETHASONE SODIUM PHOSPHATE 20 MG: 10 INJECTION, SOLUTION INTRAMUSCULAR; INTRAVENOUS at 09:04

## 2021-02-11 RX ADMIN — LOSARTAN POTASSIUM 100 MG: 50 TABLET, FILM COATED ORAL at 20:42

## 2021-02-11 RX ADMIN — FENTANYL CITRATE 50 MCG: 50 INJECTION, SOLUTION INTRAMUSCULAR; INTRAVENOUS at 08:58

## 2021-02-11 RX ADMIN — HYDROCHLOROTHIAZIDE 12.5 MG: 12.5 CAPSULE ORAL at 11:58

## 2021-02-11 RX ADMIN — PIPERACILLIN AND TAZOBACTAM 3375 MG: 3; .375 INJECTION, POWDER, FOR SOLUTION INTRAVENOUS at 23:24

## 2021-02-11 ASSESSMENT — PAIN SCALES - GENERAL
PAINLEVEL_OUTOF10: 6
PAINLEVEL_OUTOF10: 3
PAINLEVEL_OUTOF10: 0
PAINLEVEL_OUTOF10: 0
PAINLEVEL_OUTOF10: 4
PAINLEVEL_OUTOF10: 3
PAINLEVEL_OUTOF10: 5
PAINLEVEL_OUTOF10: 0
PAINLEVEL_OUTOF10: 2
PAINLEVEL_OUTOF10: 5
PAINLEVEL_OUTOF10: 6
PAINLEVEL_OUTOF10: 2

## 2021-02-11 ASSESSMENT — PAIN DESCRIPTION - FREQUENCY
FREQUENCY: CONTINUOUS

## 2021-02-11 ASSESSMENT — PAIN DESCRIPTION - ORIENTATION
ORIENTATION: LOWER

## 2021-02-11 ASSESSMENT — PAIN DESCRIPTION - LOCATION
LOCATION: ABDOMEN

## 2021-02-11 ASSESSMENT — PAIN DESCRIPTION - DESCRIPTORS
DESCRIPTORS: ACHING
DESCRIPTORS: ACHING;THROBBING
DESCRIPTORS: ACHING

## 2021-02-11 ASSESSMENT — PAIN DESCRIPTION - PAIN TYPE
TYPE: SURGICAL PAIN

## 2021-02-11 ASSESSMENT — PAIN - FUNCTIONAL ASSESSMENT: PAIN_FUNCTIONAL_ASSESSMENT: 0-10

## 2021-02-11 NOTE — OP NOTE
Operative Note        Patient: Norman Zuniga  YOB: 1960  MRN: [id number]   Yobany Richard MD      Date of Procedure: 2/11/2021    Pre-Op Diagnosis: acute appendicitis    Post-Op Diagnosis: acute suppurative appendicitis without rupture       Procedure(s):  Robotic assisted laparoscopic appendectomy    Surgeon(s):  Beau Rojo DO      Anesthesia: General + US guided TAP block    Estimated Blood Loss (mL): 10 ml    Complications: None    Specimens:   Appendix    Counts: correct    Procedure details:    Please see H&P. Pre-op IV antibiotics given. Informed consent obtained. Patient brought to OR suite, placed under general anesthesia, positioned appropriately, surgical pause performed, site prepped and draped in sterile fashion. Tap block was performed by anesthesia. No ray was placed. Pneumoperitoneum is established to 15 mm Hg via Veress needle technique in the LUQ. LUQ 12 mm optiview port placed with camera inserted and abdominal cavity entered easily. Veress needle confirmed and removed. Additional 8 mm robotic ports placed under direct visualization in the LLQ and left lateral abdomen equidistant between both lateral ports. The LUQ port was exchanged out for the robotic stapler port. The Da Janny X model was docked, appendix targeted, then assist port instruments docked. Left hand- fenestrated bipolar graspers. Right hand robotic vessel sealer. Middle port- camera placement. Findings: acute suppurative appendicitis without rupture. Normal cecum, terminal ileum. Window is created at base of appendix. Mesoappendix taken down with vessel sealer staying close to the appendix, away from the terminal ileum. Appendectomy performed with 45 mm length blue 3.5 mm staple cartridge and fired successfully. Staple line hemostatic and intact. Irrigation was performed. The robot is undocked and instruments removed.  The appendix is then placed in Endocatch bag and removed from the LUQ stapler port site. All sites are re-inspected and hemostatic. Pneumoperitoneum is released. Fascia of LUQ port site closed with 0 vicryl. Wounds irrigated. Skin closed with skin staples. All sponge, needle, and instrument counts correct. I spoke with family afterwards.        Electronically signed by Paola Marcano DO, FACOS, FACS on 2/11/2021 at 10:45 AM

## 2021-02-11 NOTE — PROGRESS NOTES

## 2021-02-11 NOTE — PROGRESS NOTES
Patient resting in bed, alert and oriented. Patient vitals and assessment completed, see charting on all. Patient does have pain, but denies needs to be medicated.

## 2021-02-11 NOTE — PROGRESS NOTES
Discussed discharge plans with the patient and wife. Patient is a 62year old male here with acute appendicitis. He is alert and oriented. Patient is  and lives at home with his wife. He uses no medical equipment. Both the patient and wife do the cooking and cleaning. He is independent with his ADL's. Patient manages his own medication and drives. He uses no outside services in the home. His PCP is Dr. Ramandeep Gautam MD. He has medical insurance that helps with medication cost.    The discharge plan is home with no services. He does not have advance directives. LSW to monitor and assist with any needs or concerns as they arise.     NOLBERTO Osullivan

## 2021-02-11 NOTE — DISCHARGE SUMMARY
Surgery Discharge Summary     Patient Identification  Monroe Kerr is a 62 y.o. male. :  1960  Admit Date:  2/10/2021  PCP: Ramandeep Gautam MD    Discharge date:   2021 11:15 AM                                   Disposition: home in stable condition    Discharge Diagnoses:   Patient Active Problem List   Diagnosis    Hyperlipidemia    Hypertension    Type 2 diabetes mellitus with complication ([address]    Depression    Acute appendicitis         Consults: none    Surgery: robotic appendectomy    Patient Instructions:     [id number] [address] Caicedo to walk around as much as you want and climb stairs. No lifting more than 10-15 pounds for two weeks. Then limit can be 20 pounds for weeks 3-4. Ok to shower in 24 hours. Ice pack to incisions 2-3 times a day for 10-15 minutes at a time AS NEEDED - your incisions will be bruised and swollen and feel \"lumpy\" and hard- especially at the belly button- this is  normal for about 2-3 weeks. No soaking in a tub bath or pool. No driving for at least 4-5 days, or if still needing to take pain medications. Ok to lay on your belly, or sides, any position is fine. Expect some blood tinged drainage from incisions for a day or so. You may feel upper shoulder/back pain from the residual air in the belly from surgery. The more you walk around and take some deep breaths, the faster it will be absorbed. Take tylenol or motrin for milder pain. MUST avoid constipation. Take a stool softener as needed. If no bowel movement in 2-3 days, take a whole glass bottle of Magnesium Citrate over the counter, take as directed. Ok to take other laxative such as Miralax if you want or need. Call office or go to ER if fevers over 101, severe shortness of breath, uncontrolled nausea or vomiting.        Discharge Medications:      Flavia Nagy"   Home Medication Instructions LENA:[id number]    Printed on:21 1203   Medication Information aspirin 81 MG EC tablet  Take 81 mg by mouth daily             fenofibrate (TRIGLIDE) 160 MG tablet  Take 1 tablet by mouth daily             glucose blood VI test strips (ASCENSIA AUTODISC VI;ONE TOUCH ULTRA TEST VI) strip  1 each by In Vitro route 2 times daily. Pt uses one touch ultra test strips/ pt tests bid/ disp: 3 month supply /Dx: 250.00/272.2/401.1             hydroCHLOROthiazide (MICROZIDE) 12.5 MG capsule  Take 1 capsule by mouth every morning             HYDROcodone-acetaminophen (NORCO) 5-325 MG per tablet  Take 1 tablet by mouth every 6 hours as needed for Pain for up to 4 days. Intended supply: 3 days. Take lowest dose possible to manage pain             losartan (COZAAR) 100 MG tablet  Take 1 tablet by mouth daily             metFORMIN (GLUCOPHAGE) 1000 MG tablet  Take 1 tablet by mouth 2 times daily (with meals)             ondansetron (ZOFRAN) 4 MG tablet  Take 1 tablet by mouth every 8 hours as needed for Nausea or Vomiting             pravastatin (PRAVACHOL) 40 MG tablet  Take 1 tablet by mouth daily                  HPI and Hospital Course:   Patient was admitted for acute appendicitis and underwent surgery and required stay for IV antibiotics, IV fluids, IV pain control.  Follow up 1 week staples removal.      Reilly Robertson DO, MPH, [facility]  General Surgery  [address] [phone]

## 2021-02-11 NOTE — PROGRESS NOTES
Dr. Munoz Smoker returned call and clarified that pt can be discharged tomorrow 2/12/2021 and that she put the order in today so that she did not get too busy tomorrow and forget. Per Dr. Munoz Smoker pt can advance diet as tolerated on Friday as well.   Pt can be discharged later morning to any time the rest of the day

## 2021-02-11 NOTE — CONSULTS
mouth daily 1/8/21  Yes Nely Vuong MD   losartan (COZAAR) 100 MG tablet Take 1 tablet by mouth daily 1/8/21  Yes Nely Vuong MD   hydroCHLOROthiazide (MICROZIDE) 12.5 MG capsule Take 1 capsule by mouth every morning 1/8/21  Yes Nely Vuong MD   glucose blood VI test strips (ASCENSIA AUTODISC VI;ONE TOUCH ULTRA TEST VI) strip 1 each by In Vitro route 2 times daily. Pt uses one touch ultra test strips/ pt tests bid/ disp: 3 month supply /Dx: 250.00/272.2/401.1    Historical Provider, MD       Allergies:  Patient has no known allergies. Social History:   TOBACCO:   reports that he quit smoking about 4 years ago. His smoking use included cigarettes. He has a 5.00 pack-year smoking history. He has never used smokeless tobacco.  ETOH:   reports no history of alcohol use. Family History:       Problem Relation Age of Onset    Heart Disease Father        Review of Systems:  Pertinent items are noted in HPI. Objective:    Vitals:   Vitals:    02/11/21 1320   BP: 118/68   Pulse: 54   Resp: 18   Temp: 98.4 °F (36.9 °C)   SpO2: 94%     Weight: 252 lb 11.2 oz (114.6 kg)   Height: 5' 10\" (177.8 cm)   -----------------------------------------------------------------  Exam:  General Appearance:  awake, alert, oriented, in no acute distress, well developed, well nourished and in no acute distress  Head/face:  NCAT  Eyes:  No gross abnormalities. , PERRL and EOMI  Ears:  exam deferred  Nose/Sinuses:  negative  Mouth/Throat:  Mucosa moist.  No lesions. Pharynx without erythema, edema or exudate. Neck:  neck- supple, no mass, non-tender  Lungs:  Normal expansion. Clear to auscultation. No rales, rhonchi, or wheezing. Heart:  Heart sounds are normal.  Regular rate and rhythm without murmur, gallop or rub. Abdomen: Soft tender hypoactive bowel sounds. To dressings clean dry and intact  Extremities: Extremities warm to touch, pink, with no edema.  and pulses present in all extremities  Musculoskeletal:  negative  Peripheral Pulses:  Normal, Capillary refill <2secs, strong peripheral pulses  Neurologic:  negative  Skin:  Skin color, texture, turgor normal. No rashes or lesions.      -----------------------------------------------------------------  Diagnostic Data: Reviewed    Assessment:          Principal Problem:    Acute appendicitis  Active Problems:    Hypertension    Type 2 diabetes mellitus with complication (HCC)    Depression  Resolved Problems:    * No resolved hospital problems. *      Recommendation:     · Acute appendicitis  · Appreciate Dr. Jaspreet Burch  · Normal saline at 100 mL's per hour  · Pain control  · Ambulate  · Clear liquid diet  · IV Zosyn  · Type 2 diabetes  · Continue Glucophage  · Hypertension  · Continue Triglide, Cozaar, HCTZ  · Thank you very much for allowing me to participate in the care of this patient.      SANDY Menjivar-CNP

## 2021-02-11 NOTE — ANESTHESIA POSTPROCEDURE EVALUATION
Department of Anesthesiology  Postprocedure Note    Patient: Monroe Kerr  MRN: [id number]  YOB: 1960  Date of evaluation: 2/11/2021  Time:  11:00 AM     Procedure Summary     Date: 02/11/21 Room / Location: [address]    Anesthesia Start: 0994 Anesthesia Stop:     Procedure: APPENDECTOMY LAPAROSCOPIC ROBOTIC (N/A ) Diagnosis: (ACUTE APPENDICITIS)    Surgeons: Karlos Staff,  Responsible Provider: SANDY Loo CRNA    Anesthesia Type: general ASA Status: 2          Anesthesia Type: general    Jose Phase I: Jose Score: 10    Jose Phase II:      Last vitals: Reviewed and per EMR flowsheets.        Anesthesia Post Evaluation    Patient location during evaluation: PACU  Patient participation: complete - patient participated  Level of consciousness: awake and alert  Pain score: 0  Airway patency: patent  Nausea & Vomiting: no nausea and no vomiting  Complications: no  Cardiovascular status: blood pressure returned to baseline  Respiratory status: acceptable and room air  Hydration status: stable  Comments: awake and alert, no c/o

## 2021-02-11 NOTE — BRIEF OP NOTE
Brief Postoperative Note      Patient: Gail Armas  YOB: 1960  MRN: [id number]   Ubaldo Lr MD      Date of Procedure: 2/11/2021    Pre-Op Diagnosis: acute appendicitis    Post-Op Diagnosis: acute suppurative appendicitis without rupture       Procedure(s):  Robotic assisted laparoscopic appendectomy    Surgeon(s):  Paola Marcano DO    Assistant:  * No surgical staff found *    Anesthesia: General + US guided TAP block    Estimated Blood Loss (mL): 10 ml    Complications: None    Specimens:   Appendix    Counts: correct    Electronically signed by Paola Marcano DO, FACOS, JOSS on 2/11/2021 at 10:45 AM

## 2021-02-11 NOTE — PLAN OF CARE
Problem: Pain:  Goal: Pain level will decrease  Description: Pain level will decrease  Outcome: Ongoing  Note: Patient is in with diagnosis of appendicitis, he does have c/o of pain which is tolerable at times and other times needing medication  He has been medicated with toradol IV this shift for pain     Goal: Control of acute pain  Description: Control of acute pain  Outcome: Ongoing  Note: Rest, prn medications   Goal: Control of chronic pain  Description: Control of chronic pain  Outcome: Ongoing  Note: none     Problem: Falls - Risk of:  Goal: Will remain free from falls  Description: Will remain free from falls  Outcome: Ongoing  Note: Patient is not a HFR  He is using the urinal and will call out appropriately if needed  Spouse is in with patient as well and can help if needed   Goal: Absence of physical injury  Description: Absence of physical injury  Outcome: Ongoing  Note: No injuries      Problem: Activity:  Goal: Ability to tolerate increased activity will improve  Description: Ability to tolerate increased activity will improve  Outcome: Ongoing  Note: Patient is getting up as tolerated      Problem:  Bowel/Gastric:  Goal: Gastrointestinal status for postoperative course will improve  Description: Gastrointestinal status for postoperative course will improve  Outcome: Ongoing  Note: Patient has not had surgery yet      Problem: Nutritional:  Goal: Maintenance of adequate nutrition will improve  Description: Maintenance of adequate nutrition will improve  Outcome: Ongoing  Note: Patient nutrition will be monitored  He is NPO at this time       Problem: Physical Regulation:  Goal: Postoperative complications will be avoided or minimized  Description: Postoperative complications will be avoided or minimized  Outcome: Ongoing  Note: Patient has not had surgery yet      Problem: Sensory:  Goal: Pain level will decrease  Description: Pain level will decrease  Outcome: Ongoing  Note: Patient is in with

## 2021-02-11 NOTE — ANESTHESIA PRE PROCEDURE
 [MAR Hold] 0.9 % sodium chloride infusion   Intravenous Continuous Corry I Nazemi,  mL/hr at 21 New Bag at 21    [MAR Hold] ketorolac (TORADOL) injection 15 mg  15 mg Intravenous BID PRN Corry I Nazemi, DO   15 mg at 02/10/21 2102       Allergies:  No Known Allergies    Problem List:    Patient Active Problem List   Diagnosis Code    Hyperlipidemia E78.5    Hypertension I10    Type 2 diabetes mellitus with complication (HCC) D90.7    Depression F32.9    Acute appendicitis K35.80       Past Medical History:        Diagnosis Date    Depression     Diabetes mellitus ([facility]     Hyperlipidemia     Hypertension        Past Surgical History:        Procedure Laterality Date    TONSILLECTOMY      WISDOM TOOTH EXTRACTION         Social History:    Social History     Tobacco Use    Smoking status: Former Smoker     Packs/day: 0.50     Years: 10.00     Pack years: 5.00     Types: Cigarettes     Quit date: 10/1/2016     Years since quittin.3    Smokeless tobacco: Never Used   Substance Use Topics    Alcohol use: No     Alcohol/week: 0.0 standard drinks                                Counseling given: Not Answered      Vital Signs (Current):   Vitals:    21 0345 21 0600 21 0716 21 0839   BP: (!) 129/59  128/71    Pulse: 72  72 70   Resp: 18  18 16   Temp: 38.2 °C (100.8 °F) 38.4 °C (101.1 °F) 37.8 °C (100 °F) 36.8 °C (98.2 °F)   TempSrc: Temporal Temporal Temporal Temporal   SpO2:   96%    Weight: 255 lb 3.2 oz (115.8 kg)   252 lb 11.2 oz (114.6 kg)   Height:    5' 10\" (1.778 m)                                              BP Readings from Last 3 Encounters:   21 128/71   21 (!) 154/91   20 120/81       NPO Status: Time of last liquid consumption: 1330                        Time of last solid consumption: 1730                        Date of last liquid consumption: 02/10/21 Date of last solid food consumption: 02/09/21    BMI:   Wt Readings from Last 3 Encounters:   02/11/21 252 lb 11.2 oz (114.6 kg)   01/08/21 257 lb (116.6 kg)   04/30/20 253 lb (114.8 kg)     Body mass index is 36.26 kg/m².     CBC:   Lab Results   Component Value Date    WBC 13.0 02/10/2021    RBC 5.26 02/10/2021    RBC 5.06 12/16/2020    HGB 15.8 02/10/2021    HCT 47.2 02/10/2021    MCV 89.7 02/10/2021    RDW 13.7 02/10/2021     02/10/2021       CMP:   Lab Results   Component Value Date     02/10/2021    K 4.2 02/10/2021     02/10/2021    CO2 25 02/10/2021    BUN 19 02/10/2021    CREATININE 1.00 02/10/2021    GFRAA >60 02/10/2021    LABGLOM >60 02/10/2021    GLUCOSE 162 02/10/2021    GLUCOSE 135 12/16/2020    PROT 7.7 02/10/2021    CALCIUM 9.5 02/10/2021    BILITOT 0.46 02/10/2021    ALKPHOS 48 02/10/2021    AST 19 02/10/2021    ALT 22 02/10/2021       POC Tests:   Recent Labs     02/11/21  0645   POCGLU 140*       Coags: No results found for: PROTIME, INR, APTT    HCG (If Applicable): No results found for: PREGTESTUR, PREGSERUM, HCG, HCGQUANT     ABGs: No results found for: PHART, PO2ART, XRC0TRC, ACX0BGA, BEART, X7SBDMTS     Type & Screen (If Applicable):  No results found for: LABABO, LABRH    Drug/Infectious Status (If Applicable):  Lab Results   Component Value Date    HEPCAB Non-Reactive 09/06/2019       COVID-19 Screening (If Applicable):   Lab Results   Component Value Date    COVID19 Not Detected 02/10/2021         Anesthesia Evaluation  Patient summary reviewed and Nursing notes reviewed no history of anesthetic complications:   Airway: Mallampati: III  TM distance: <3 FB   Neck ROM: full  Mouth opening: > = 3 FB Dental: normal exam         Pulmonary:Negative Pulmonary ROS and normal exam  breath sounds clear to auscultation                             Cardiovascular:  Exercise tolerance: good (>4 METS),   (+) hypertension:, hyperlipidemia Neuro/Psych:   (+) psychiatric history:            GI/Hepatic/Renal:   (+) GERD: poorly controlled, morbid obesity          Endo/Other:    (+) DiabetesType II DM, well controlled, , .                 Abdominal:   (+) obese,         Vascular: negative vascular ROS. Anesthesia Plan      general     ASA 2     (Discussed PNB with pt, consented)  Induction: intravenous. Anesthetic plan and risks discussed with patient.                       Asberry Castleman, APRN - CRNA   2/11/2021

## 2021-02-11 NOTE — PROGRESS NOTES
Nutrition Assessment     Type and Reason for Visit: Initial    Nutrition Recommendations/Plan:   1. Continue NPO diet. 2. F/u diet progression. Nutrition Assessment:  Inadequate oral intakes r/t altered GI aeb NPO for appendectomy. Hx DM, HTN, HLD. Pt currently in surgery. A1c 6.1. Malnutrition Assessment:  Malnutrition Status: Insufficient data      Nutrition Related Findings: unable to assess       Current Nutrition Therapies:    Diet NPO Effective Now    Anthropometric Measures:  · Height: 5' 10\" (177.8 cm)  · Current Body Wt: 252 lb 11.2 oz (114.6 kg)   · BMI: 36.3    Nutrition Diagnosis:   · Inadequate energy intake related to altered GI function as evidenced by NPO or clear liquid status due to medical condition      Nutrition Interventions:   Food and/or Nutrient Delivery:  Continue NPO  Nutrition Education/Counseling:  No recommendation at this time   Coordination of Nutrition Care:  No recommendation at this time    Goals:  Resume oral intakes when medically appropriate       Recent Labs     02/10/21  1301      K 4.2      CO2 25   BUN 19   CREATININE 1.00   GLUCOSE 162*   ALT 22   ALKPHOS 48   GFR                 Lab Results   Component Value Date    LABALBU 4.8 02/10/2021      Nutrition Monitoring and Evaluation:   Behavioral-Environmental Outcomes:  None Identified   Food/Nutrient Intake Outcomes:  Diet Advancement/Tolerance  Physical Signs/Symptoms Outcomes:  Biochemical Data, Skin, Weight     Discharge Planning:     Too soon to determine     Electronically signed by Joey Cunningham RD, LD on 2/11/21 at 9:11 AM EST    Contact: [phone]

## 2021-02-11 NOTE — PROGRESS NOTES
This writer called Dr. Paulo Thomas office to verify discharge order as pt and wife were told that pt was not going to be discharged until tomorrow. Pt is currently only on clear liquids and has not had an advance in diet.   Susannah from Dr. Paulo Thomas office will get message to Dr. Carmen Gonzalez and have her get back to us with clarification

## 2021-02-12 VITALS
WEIGHT: 262.6 LBS | OXYGEN SATURATION: 95 % | TEMPERATURE: 98.4 F | DIASTOLIC BLOOD PRESSURE: 68 MMHG | HEART RATE: 55 BPM | BODY MASS INDEX: 37.59 KG/M2 | RESPIRATION RATE: 18 BRPM | SYSTOLIC BLOOD PRESSURE: 122 MMHG | HEIGHT: 70 IN

## 2021-02-12 PROCEDURE — G0378 HOSPITAL OBSERVATION PER HR: HCPCS

## 2021-02-12 PROCEDURE — 99024 POSTOP FOLLOW-UP VISIT: CPT | Performed by: SURGERY

## 2021-02-12 PROCEDURE — 6360000002 HC RX W HCPCS: Performed by: SURGERY

## 2021-02-12 PROCEDURE — 2580000003 HC RX 258: Performed by: SURGERY

## 2021-02-12 PROCEDURE — 6370000000 HC RX 637 (ALT 250 FOR IP): Performed by: SURGERY

## 2021-02-12 RX ADMIN — METFORMIN HYDROCHLORIDE 1000 MG: 500 TABLET ORAL at 07:58

## 2021-02-12 RX ADMIN — PIPERACILLIN AND TAZOBACTAM 3375 MG: 3; .375 INJECTION, POWDER, FOR SOLUTION INTRAVENOUS at 07:58

## 2021-02-12 RX ADMIN — SODIUM CHLORIDE: 9 INJECTION, SOLUTION INTRAVENOUS at 07:58

## 2021-02-12 RX ADMIN — HYDROCHLOROTHIAZIDE 12.5 MG: 12.5 CAPSULE ORAL at 07:58

## 2021-02-12 RX ADMIN — ASPIRIN 81 MG: 81 TABLET, COATED ORAL at 07:58

## 2021-02-12 ASSESSMENT — PAIN DESCRIPTION - LOCATION
LOCATION: ABDOMEN
LOCATION: ABDOMEN

## 2021-02-12 ASSESSMENT — PAIN DESCRIPTION - DESCRIPTORS: DESCRIPTORS: ACHING

## 2021-02-12 ASSESSMENT — PAIN DESCRIPTION - ORIENTATION: ORIENTATION: LOWER

## 2021-02-12 NOTE — PROGRESS NOTES
Spoke with Dr. Carmen Gonzalez via telephone at this time. Per Dr. Carmen Gonzalez, patient can be discharged anytime. Per Dr. Carmen Gonzalez, she will fill out discharge instructions and do med rec and then patient is good to go. Will alert patient and family.

## 2021-02-12 NOTE — PROGRESS NOTES
Jenifer Alfredo M.D. Internal Medicine Progress Note     SUBJECTIVE:    Patient seen for f/u of Acute appendicitis. He is feeling much better today. Pain is controlled. Passing flatus and stool. Denies nausea or vomiting. ROS:   Constitutional: negative  for fevers, and negative for chills. Respiratory: negative for shortness of breath, negative for cough, and negative for wheezing  Cardiovascular: negative for chest pain, and negative for palpitations  Gastrointestinal: negative for abdominal pain, negative for nausea,negative for vomiting, negative for diarrhea, and negative for constipation     All other systems were reviewed with the patient and are negative unless otherwise stated in HPI    OBJECTIVE:  Vitals:   Temp: 98.4 °F (36.9 °C)  BP: 122/68  Resp: 18  Pulse: 55  SpO2: 95 % on room air    24HR INTAKE/OUTPUT:      Intake/Output Summary (Last 24 hours) at 2/12/2021 0752  Last data filed at 2/12/2021 0701  Gross per 24 hour   Intake 3803.04 ml   Output 900 ml   Net 2903.04 ml     -----------------------------------------------------------------  Exam:  GEN:    Awake, alert and oriented x3. EYES:  EOMI, pupils equal   NECK: Supple. No lymphadenopathy. No carotid bruit  CVS:    regular rate and rhythm, no audible murmur  PULM:  CTA, no wheezes, rales or rhonchi, no acute respiratory distress  ABD:    Bowels sounds normal.  Abdomen is soft. No distention. no tenderness to palpation. EXT:   no edema bilaterally  . No calf tenderness. NEURO: Moves all extremities.   Motor and sensory are grossly intact  SKIN:  lap incisions intact  -----------------------------------------------------------------  Diagnostic Data:    · All available data reviewed  Lab Results   Component Value Date    WBC 13.0 (H) 02/10/2021    HGB 15.8 02/10/2021    MCV 89.7 02/10/2021     02/10/2021      Lab Results   Component Value Date    GLUCOSE 162 (H) 02/10/2021    BUN 19 02/10/2021    CREATININE 1.00 02/10/2021     02/10/2021    K 4.2 02/10/2021    CALCIUM 9.5 02/10/2021     02/10/2021    CO2 25 02/10/2021       CT ABDOMEN PELVIS W IV CONTRAST Additional Contrast? None   Final Result   Acute appendicitis. No organized periappendiceal collections or free air. Secondary inflammatory thickening of the tip of the cecum. Inflammatory changes along the right paracolic gutter with a trace volume of   complex free fluid in the dependent pelvis. ASSESSMENT / PLAN:  · Acute appendicitis  ? S/p lap appendectomy  ? IV zosyn  ? Per Dr. Zoe Zavala - advance diet tomorrow and DC  · Diabetes mellitus   ? Continue metofrmin  · Hypertension  ?  Continue Losartan/HCTZ  · Nutrition status: obesity, non-morbid  · High risk medications: none   · Disposition:  Discharge plan is home    Cass Boyle M.D.  2/12/2021  7:52 AM

## 2021-02-12 NOTE — PROGRESS NOTES
Spoke with [facility] in pharmacy at this time, per [facility], zosyn can be run over 30 minutes so patient can be discharged sooner if discharge is complete.

## 2021-02-12 NOTE — PLAN OF CARE
Problem: Pain:  Goal: Pain level will decrease  Description: Pain level will decrease  Outcome: Ongoing  Note: Pain assessed routinely and as needed with a 0-10 scale. PRN pain medication given as appropriate per orders. Pain reassessed within an hour, will continue to monitor    Goal: Control of acute pain  Description: Control of acute pain  Outcome: Ongoing  Goal: Control of chronic pain  Description: Control of chronic pain  Outcome: Ongoing     Problem: Falls - Risk of:  Goal: Will remain free from falls  Description: Will remain free from falls  Outcome: Ongoing  Note: Fall risk assessment done and patient is a high risk for falls. Alarms on as needed for patient safety. Patient being monitored on a regular basis. No falls noted at this time. Goal: Absence of physical injury  Description: Absence of physical injury  Outcome: Ongoing     Problem: Activity:  Goal: Ability to tolerate increased activity will improve  Description: Ability to tolerate increased activity will improve  Outcome: Ongoing  Note: Patient has been ambulating around room well. Will continue to monitor. Problem: Bowel/Gastric:  Goal: Gastrointestinal status for postoperative course will improve  Description: Gastrointestinal status for postoperative course will improve  Outcome: Ongoing  Note: No BM yet. Will continue to monitor. Problem: Nutritional:  Goal: Maintenance of adequate nutrition will improve  Description: Maintenance of adequate nutrition will improve  Outcome: Ongoing     Problem: Physical Regulation:  Goal: Postoperative complications will be avoided or minimized  Description: Postoperative complications will be avoided or minimized  Outcome: Ongoing     Problem: Sensory:  Goal: Pain level will decrease  Description: Pain level will decrease  Outcome: Ongoing  Note: Pain assessed routinely and as needed with a 0-10 scale. PRN pain medication given as appropriate per orders.  Pain reassessed within an hour, will continue to monitor       Problem: Skin Integrity:  Goal: Demonstration of wound healing without infection will improve  Description: Demonstration of wound healing without infection will improve  Outcome: Ongoing  Note: Patient is able to turn self as needed. No new open areas or redness noted.  Will continue to assess

## 2021-02-12 NOTE — PROGRESS NOTES
Bed scale reading that pt has a 10 lb weight gain, bed re-zeroed, lung sounds are clear. Patient has been urinating throughout the night.

## 2021-02-12 NOTE — PLAN OF CARE
Problem: Pain:  Goal: Pain level will decrease  Description: Pain level will decrease  2/12/2021 0838 by Jt Cameron RN  Outcome: Ongoing     Problem: Pain:  Goal: Control of acute pain  Description: Control of acute pain  2/12/2021 0838 by Jt Cameron RN  Outcome: Ongoing     Problem: Pain:  Goal: Control of chronic pain  Description: Control of chronic pain  2/12/2021 0838 by Jt Cameron RN  Outcome: Ongoing     Problem: Falls - Risk of:  Goal: Will remain free from falls  Description: Will remain free from falls  2/12/2021 0838 by Jt Cameron RN  Outcome: Ongoing     Problem: Falls - Risk of:  Goal: Absence of physical injury  Description: Absence of physical injury  2/12/2021 0838 by Jt Cameron RN  Outcome: Ongoing     Problem: Activity:  Goal: Ability to tolerate increased activity will improve  Description: Ability to tolerate increased activity will improve  2/12/2021 0838 by Jt Cameron RN  Outcome: Ongoing     Problem:  Bowel/Gastric:  Goal: Gastrointestinal status for postoperative course will improve  Description: Gastrointestinal status for postoperative course will improve  2/12/2021 0838 by Jt Cameron RN  Outcome: Ongoing     Problem: Nutritional:  Goal: Maintenance of adequate nutrition will improve  Description: Maintenance of adequate nutrition will improve  2/12/2021 0838 by Jt Cameron RN  Outcome: Ongoing     Problem: Physical Regulation:  Goal: Postoperative complications will be avoided or minimized  Description: Postoperative complications will be avoided or minimized  2/12/2021 0838 by Jt Cameron RN  Outcome: Ongoing     Problem: Sensory:  Goal: Pain level will decrease  Description: Pain level will decrease  2/12/2021 0838 by Jt Cameron RN  Outcome: Ongoing     Problem: Skin Integrity:  Goal: Demonstration of wound healing without infection will improve  Description: Demonstration of wound healing without infection will improve  2/12/2021 0838 by Jt Cameron RN  Outcome: Ongoing

## 2021-02-15 LAB — SURGICAL PATHOLOGY REPORT: NORMAL

## 2021-02-19 ENCOUNTER — OFFICE VISIT (OUTPATIENT)
Dept: SURGERY | Age: 61
End: 2021-02-19
Payer: COMMERCIAL

## 2021-02-19 VITALS
BODY MASS INDEX: 35.98 KG/M2 | HEART RATE: 58 BPM | HEIGHT: 70 IN | DIASTOLIC BLOOD PRESSURE: 84 MMHG | WEIGHT: 251.3 LBS | SYSTOLIC BLOOD PRESSURE: 150 MMHG | TEMPERATURE: 97.2 F

## 2021-02-19 DIAGNOSIS — Z09 POSTOP CHECK: Primary | ICD-10-CM

## 2021-02-19 PROCEDURE — 99024 POSTOP FOLLOW-UP VISIT: CPT | Performed by: SURGERY

## 2021-02-20 PROCEDURE — 64488 TAP BLOCK BI INJECTION: CPT | Performed by: NURSE ANESTHETIST, CERTIFIED REGISTERED

## 2021-02-20 NOTE — ADDENDUM NOTE
Addendum  created 02/20/21 0950 by SANDY Faulkner - CRNA    Child order released for a procedure order, Clinical Note Signed, Intraprocedure Blocks edited

## 2021-11-09 PROBLEM — K35.80 ACUTE APPENDICITIS: Status: RESOLVED | Noted: 2021-02-10 | Resolved: 2021-11-09

## 2021-11-17 ENCOUNTER — TELEPHONE (OUTPATIENT)
Dept: SURGERY | Age: 61
End: 2021-11-17

## 2021-11-17 NOTE — TELEPHONE ENCOUNTER
Patient scheduled for direct colonoscopy with Dr. Ceferino Reyes on 4/29/22. All questions answered, bowel prep instructions given to patient.

## 2022-01-21 ENCOUNTER — ANESTHESIA EVENT (OUTPATIENT)
Dept: OPERATING ROOM | Age: 62
End: 2022-01-21
Payer: COMMERCIAL

## 2022-01-23 PROBLEM — H25.011 CORTICAL AGE-RELATED CATARACT OF RIGHT EYE: Chronic | Status: ACTIVE | Noted: 2022-01-23

## 2022-01-24 ENCOUNTER — HOSPITAL ENCOUNTER (OUTPATIENT)
Age: 62
Setting detail: OUTPATIENT SURGERY
Discharge: HOME | End: 2022-01-24
Attending: OPHTHALMOLOGY | Admitting: OPHTHALMOLOGY
Payer: COMMERCIAL

## 2022-01-24 ENCOUNTER — ANESTHESIA (OUTPATIENT)
Dept: OPERATING ROOM | Age: 62
End: 2022-01-24
Payer: COMMERCIAL

## 2022-01-24 VITALS
RESPIRATION RATE: 18 BRPM | HEIGHT: 69 IN | BODY MASS INDEX: 28.51 KG/M2 | TEMPERATURE: 98.4 F | WEIGHT: 192.5 LBS | SYSTOLIC BLOOD PRESSURE: 113 MMHG | HEART RATE: 44 BPM | OXYGEN SATURATION: 95 % | DIASTOLIC BLOOD PRESSURE: 54 MMHG

## 2022-01-24 VITALS
OXYGEN SATURATION: 97 % | SYSTOLIC BLOOD PRESSURE: 125 MMHG | DIASTOLIC BLOOD PRESSURE: 59 MMHG | RESPIRATION RATE: 15 BRPM

## 2022-01-24 PROBLEM — H25.011 CORTICAL AGE-RELATED CATARACT OF RIGHT EYE: Chronic | Status: RESOLVED | Noted: 2022-01-23 | Resolved: 2022-01-24

## 2022-01-24 PROCEDURE — 7100000010 HC PHASE II RECOVERY - FIRST 15 MIN: Performed by: OPHTHALMOLOGY

## 2022-01-24 PROCEDURE — 6370000000 HC RX 637 (ALT 250 FOR IP): Performed by: NURSE ANESTHETIST, CERTIFIED REGISTERED

## 2022-01-24 PROCEDURE — 7100000011 HC PHASE II RECOVERY - ADDTL 15 MIN: Performed by: OPHTHALMOLOGY

## 2022-01-24 PROCEDURE — 3700000000 HC ANESTHESIA ATTENDED CARE: Performed by: OPHTHALMOLOGY

## 2022-01-24 PROCEDURE — 2709999900 HC NON-CHARGEABLE SUPPLY: Performed by: OPHTHALMOLOGY

## 2022-01-24 PROCEDURE — 3700000001 HC ADD 15 MINUTES (ANESTHESIA): Performed by: OPHTHALMOLOGY

## 2022-01-24 PROCEDURE — V2632 POST CHMBR INTRAOCULAR LENS: HCPCS | Performed by: OPHTHALMOLOGY

## 2022-01-24 PROCEDURE — 3600000003 HC SURGERY LEVEL 3 BASE: Performed by: OPHTHALMOLOGY

## 2022-01-24 PROCEDURE — 2580000003 HC RX 258: Performed by: OPHTHALMOLOGY

## 2022-01-24 PROCEDURE — 3600000013 HC SURGERY LEVEL 3 ADDTL 15MIN: Performed by: OPHTHALMOLOGY

## 2022-01-24 PROCEDURE — 6370000000 HC RX 637 (ALT 250 FOR IP): Performed by: OPHTHALMOLOGY

## 2022-01-24 PROCEDURE — 6360000002 HC RX W HCPCS: Performed by: OPHTHALMOLOGY

## 2022-01-24 PROCEDURE — 2500000003 HC RX 250 WO HCPCS: Performed by: OPHTHALMOLOGY

## 2022-01-24 PROCEDURE — 6360000002 HC RX W HCPCS: Performed by: NURSE ANESTHETIST, CERTIFIED REGISTERED

## 2022-01-24 DEVICE — LENS INTOCU +20 DIOPT L12.5MM DIA6MM 119.1 OPTICAL/118.7 A: Type: IMPLANTABLE DEVICE | Site: EYE | Status: FUNCTIONAL

## 2022-01-24 RX ORDER — DIMENHYDRINATE 50 MG/1
50 TABLET ORAL ONCE
Status: COMPLETED | OUTPATIENT
Start: 2022-01-24 | End: 2022-01-24

## 2022-01-24 RX ORDER — MIDAZOLAM HYDROCHLORIDE 1 MG/ML
INJECTION INTRAMUSCULAR; INTRAVENOUS PRN
Status: DISCONTINUED | OUTPATIENT
Start: 2022-01-24 | End: 2022-01-24 | Stop reason: SDUPTHER

## 2022-01-24 RX ORDER — FENTANYL CITRATE 50 UG/ML
INJECTION, SOLUTION INTRAMUSCULAR; INTRAVENOUS PRN
Status: DISCONTINUED | OUTPATIENT
Start: 2022-01-24 | End: 2022-01-24 | Stop reason: SDUPTHER

## 2022-01-24 RX ORDER — SODIUM CHLORIDE 0.9 % (FLUSH) 0.9 %
5-40 SYRINGE (ML) INJECTION PRN
Status: DISCONTINUED | OUTPATIENT
Start: 2022-01-24 | End: 2022-01-24 | Stop reason: HOSPADM

## 2022-01-24 RX ORDER — TETRACAINE HYDROCHLORIDE 5 MG/ML
SOLUTION OPHTHALMIC PRN
Status: DISCONTINUED | OUTPATIENT
Start: 2022-01-24 | End: 2022-01-24 | Stop reason: ALTCHOICE

## 2022-01-24 RX ORDER — SODIUM CHLORIDE, SODIUM LACTATE, POTASSIUM CHLORIDE, CALCIUM CHLORIDE 600; 310; 30; 20 MG/100ML; MG/100ML; MG/100ML; MG/100ML
INJECTION, SOLUTION INTRAVENOUS CONTINUOUS
Status: DISCONTINUED | OUTPATIENT
Start: 2022-01-24 | End: 2022-01-24 | Stop reason: HOSPADM

## 2022-01-24 RX ORDER — TETRACAINE HYDROCHLORIDE 5 MG/ML
1 SOLUTION OPHTHALMIC SEE ADMIN INSTRUCTIONS
Status: DISCONTINUED | OUTPATIENT
Start: 2022-01-24 | End: 2022-01-24 | Stop reason: HOSPADM

## 2022-01-24 RX ORDER — PROPARACAINE HYDROCHLORIDE 5 MG/ML
1 SOLUTION/ DROPS OPHTHALMIC SEE ADMIN INSTRUCTIONS
Status: DISCONTINUED | OUTPATIENT
Start: 2022-01-24 | End: 2022-01-24 | Stop reason: HOSPADM

## 2022-01-24 RX ORDER — TROPICAMIDE 10 MG/ML
1 SOLUTION/ DROPS OPHTHALMIC SEE ADMIN INSTRUCTIONS
Status: DISCONTINUED | OUTPATIENT
Start: 2022-01-24 | End: 2022-01-24 | Stop reason: HOSPADM

## 2022-01-24 RX ORDER — PHENYLEPHRINE HCL 2.5 %
1 DROPS OPHTHALMIC (EYE) SEE ADMIN INSTRUCTIONS
Status: DISCONTINUED | OUTPATIENT
Start: 2022-01-24 | End: 2022-01-24 | Stop reason: HOSPADM

## 2022-01-24 RX ORDER — SODIUM CHLORIDE 0.9 % (FLUSH) 0.9 %
5-40 SYRINGE (ML) INJECTION EVERY 12 HOURS SCHEDULED
Status: DISCONTINUED | OUTPATIENT
Start: 2022-01-24 | End: 2022-01-24 | Stop reason: HOSPADM

## 2022-01-24 RX ORDER — SODIUM CHLORIDE, SODIUM LACTATE, POTASSIUM CHLORIDE, CALCIUM CHLORIDE 600; 310; 30; 20 MG/100ML; MG/100ML; MG/100ML; MG/100ML
INJECTION, SOLUTION INTRAVENOUS ONCE
Status: DISCONTINUED | OUTPATIENT
Start: 2022-01-24 | End: 2022-01-24 | Stop reason: HOSPADM

## 2022-01-24 RX ORDER — SODIUM CHLORIDE 9 MG/ML
25 INJECTION, SOLUTION INTRAVENOUS PRN
Status: CANCELLED | OUTPATIENT
Start: 2022-01-24

## 2022-01-24 RX ORDER — SODIUM CHLORIDE 9 MG/ML
25 INJECTION, SOLUTION INTRAVENOUS PRN
Status: DISCONTINUED | OUTPATIENT
Start: 2022-01-24 | End: 2022-01-24 | Stop reason: HOSPADM

## 2022-01-24 RX ORDER — SODIUM CHLORIDE 0.9 % (FLUSH) 0.9 %
10 SYRINGE (ML) INJECTION EVERY 12 HOURS SCHEDULED
Status: CANCELLED | OUTPATIENT
Start: 2022-01-24

## 2022-01-24 RX ORDER — SODIUM CHLORIDE 9 MG/ML
INJECTION, SOLUTION INTRAVENOUS CONTINUOUS
Status: DISCONTINUED | OUTPATIENT
Start: 2022-01-24 | End: 2022-01-24 | Stop reason: HOSPADM

## 2022-01-24 RX ORDER — SODIUM CHLORIDE 0.9 % (FLUSH) 0.9 %
10 SYRINGE (ML) INJECTION PRN
Status: CANCELLED | OUTPATIENT
Start: 2022-01-24

## 2022-01-24 RX ORDER — LIDOCAINE HYDROCHLORIDE 10 MG/ML
INJECTION, SOLUTION EPIDURAL; INFILTRATION; INTRACAUDAL; PERINEURAL PRN
Status: DISCONTINUED | OUTPATIENT
Start: 2022-01-24 | End: 2022-01-24 | Stop reason: ALTCHOICE

## 2022-01-24 RX ADMIN — TROPICAMIDE 1 DROP: 10 SOLUTION/ DROPS OPHTHALMIC at 08:16

## 2022-01-24 RX ADMIN — PHENYLEPHRINE HYDROCHLORIDE 1 DROP: 25 SOLUTION/ DROPS OPHTHALMIC at 08:16

## 2022-01-24 RX ADMIN — PROPARACAINE HYDROCHLORIDE 1 DROP: 5 SOLUTION/ DROPS OPHTHALMIC at 08:27

## 2022-01-24 RX ADMIN — TETRACAINE HYDROCHLORIDE 1 DROP: 5 SOLUTION OPHTHALMIC at 08:52

## 2022-01-24 RX ADMIN — PHENYLEPHRINE HYDROCHLORIDE 1 DROP: 25 SOLUTION/ DROPS OPHTHALMIC at 08:27

## 2022-01-24 RX ADMIN — FENTANYL CITRATE 50 MCG: 50 INJECTION INTRAMUSCULAR; INTRAVENOUS at 08:59

## 2022-01-24 RX ADMIN — PROPARACAINE HYDROCHLORIDE 1 DROP: 5 SOLUTION/ DROPS OPHTHALMIC at 08:16

## 2022-01-24 RX ADMIN — PHENYLEPHRINE HYDROCHLORIDE 1 DROP: 25 SOLUTION/ DROPS OPHTHALMIC at 08:22

## 2022-01-24 RX ADMIN — SODIUM CHLORIDE: 9 INJECTION, SOLUTION INTRAVENOUS at 08:14

## 2022-01-24 RX ADMIN — TROPICAMIDE 1 DROP: 10 SOLUTION/ DROPS OPHTHALMIC at 08:27

## 2022-01-24 RX ADMIN — MIDAZOLAM 2 MG: 1 INJECTION INTRAMUSCULAR; INTRAVENOUS at 08:58

## 2022-01-24 RX ADMIN — TROPICAMIDE 1 DROP: 10 SOLUTION/ DROPS OPHTHALMIC at 08:22

## 2022-01-24 RX ADMIN — DIMENHYDRINATE 50 MG: 50 TABLET ORAL at 08:14

## 2022-01-24 RX ADMIN — PROPARACAINE HYDROCHLORIDE 1 DROP: 5 SOLUTION/ DROPS OPHTHALMIC at 08:22

## 2022-01-24 ASSESSMENT — LIFESTYLE VARIABLES: SMOKING_STATUS: 0

## 2022-01-24 ASSESSMENT — PAIN - FUNCTIONAL ASSESSMENT: PAIN_FUNCTIONAL_ASSESSMENT: 0-10

## 2022-01-24 ASSESSMENT — PAIN SCALES - GENERAL
PAINLEVEL_OUTOF10: 0
PAINLEVEL_OUTOF10: 0

## 2022-01-24 NOTE — ANESTHESIA PRE PROCEDURE
Department of Anesthesiology  Preprocedure Note       Name:  Rhiannon Damon   Age:  62 y.o.  :  1960                                          MRN:  [id number]         Date:  2022      Surgeon: Theresa Carrizales):  Holden Clark DO    Procedure: Procedure(s):  EYE CATARACT EMULSIFICATION IOL IMPLANT    Medications prior to admission:   Prior to Admission medications    Medication Sig Start Date End Date Taking? Authorizing Provider   hydroCHLOROthiazide (MICROZIDE) 12.5 MG capsule Take 1 capsule by mouth every morning 22   Kaykay Mcintosh MD   losartan (COZAAR) 100 MG tablet Take 1 tablet by mouth daily 22   Kaykay Mcintosh MD   magnesium 30 MG tablet Take 30 mg by mouth daily     Historical Provider, MD   Probiotic Product (PROBIOTIC-10 PO) Take by mouth    Historical Provider, MD   Coenzyme Q10 (CO Q 10) 10 MG CAPS Take by mouth    Historical Provider, MD   turmeric (QC TUMERIC COMPLEX) 500 MG CAPS Take by mouth daily    Historical Provider, MD   metFORMIN (GLUCOPHAGE) 500 MG tablet Take 1 tablet by mouth 2 times daily (with meals)  Patient taking differently: Take 500 mg by mouth 2 times daily (with meals) 1/2 tab twice a day 10/8/21   Kaykay Mcintosh MD   pravastatin (PRAVACHOL) 20 MG tablet Take 1 tablet by mouth daily 10/8/21   Kaykay Mcintosh MD   aspirin 81 MG EC tablet Take 81 mg by mouth daily    Historical Provider, MD   glucose blood VI test strips (ASCENSIA AUTODISC VI;ONE TOUCH ULTRA TEST VI) strip 1 each by In Vitro route 2 times daily. Pt uses one touch ultra test strips/ pt tests bid/ disp: 3 month supply /Dx: 250.00/272.2/401.1    Historical Provider, MD       Current medications:    No current facility-administered medications for this visit. No current outpatient medications on file.      Facility-Administered Medications Ordered in Other Visits   Medication Dose Route Frequency Provider Last Rate Last Admin    lactated ringers infusion   IntraVENous Continuous Eli Mendez, APRN - CRNA        lactated ringers infusion   IntraVENous Once Saundra Ro, APRN - CRNA        0.9 % sodium chloride infusion   IntraVENous Continuous Oyl Kuo  mL/hr at 01/24/22 2374 New Bag at 01/24/22 0814    sodium chloride flush 0.9 % injection 5-40 mL  5-40 mL IntraVENous 2 times per day Oly Kuo,         sodium chloride flush 0.9 % injection 5-40 mL  5-40 mL IntraVENous PRN Oly Kuo DO        0.9 % sodium chloride infusion  25 mL IntraVENous PRN Oly Kuo DO        proparacaine (ALCAINE) 0.5 % ophthalmic solution 1 drop  1 drop Right Eye See Admin Instructions Oly Kuo DO   1 drop at 01/24/22 0827    tetracaine (TETRAVISC) 0.5 % ophthalmic solution 1 drop  1 drop Right Eye See Admin Instructions Oly Kuo DO        tropicamide (MYDRIACYL) 1 % ophthalmic solution 1 drop  1 drop Right Eye See Admin Instructions Oly Kuo, DO   1 drop at 01/24/22 0827    phenylephrine (MYDFRIN) 2.5 % ophthalmic solution 1 drop  1 drop Right Eye See Admin Instructions Oly Kuo, DO   1 drop at 01/24/22 9006       Allergies:  No Known Allergies    Problem List:    Patient Active Problem List   Diagnosis Code    Hyperlipidemia E78.5    Hypertension I10    Type 2 diabetes mellitus with complication (HCC) Y84.3    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.011       Past Medical History:        Diagnosis Date    Depression     Diabetes mellitus ([address]     Hyperlipidemia     Hypertension        Past Surgical History:        Procedure Laterality Date    LAPAROSCOPIC APPENDECTOMY N/A 02/11/2021    APPENDECTOMY LAPAROSCOPIC ROBOTIC performed by Glen Ayala DO at [address] [place] [address]         Social History:    Social History     Tobacco Use    Smoking status: Former Smoker     Packs/day: 0.50     Years: 10.00     Pack years: 5.00     Types: Cigarettes     Quit date: 10/1/2016     Years since quittin.3    Smokeless tobacco: Never Used   Substance Use Topics    Alcohol use: No     Alcohol/week: 0.0 standard drinks                                Counseling given: Not Answered      Vital Signs (Current): There were no vitals filed for this visit. BP Readings from Last 3 Encounters:   22 (!) 157/78   22 139/80   10/08/21 136/83       NPO Status:                                                                                 BMI:   Wt Readings from Last 3 Encounters:   22 192 lb 8 oz (87.3 kg)   22 201 lb 9.6 oz (91.4 kg)   10/08/21 214 lb 9.6 oz (97.3 kg)     There is no height or weight on file to calculate BMI.    CBC:   Lab Results   Component Value Date    WBC 4.8 2022    WBC 13.0 02/10/2021    RBC 4.50 2022    HGB 14.3 2022    HCT 42.0 2022    MCV 93 2022    RDW 13.7 2022     2022     02/10/2021       CMP:   Lab Results   Component Value Date     2022    K 4.4 2022     2022    CO2 26 2022    BUN 21 2022    CREATININE 0.88 2022    GFRAA >60 02/10/2021    LABGLOM >60 02/10/2021    GLUCOSE 117 2022    PROT 7.7 02/10/2021    CALCIUM 9.5 2022    BILITOT 0.46 02/10/2021    ALKPHOS 48 02/10/2021    AST 19 2022    ALT 16 2022       POC Tests:   No results for input(s): POCGLU, POCNA, POCK, POCCL, POCBUN, POCHEMO, POCHCT in the last 72 hours.     Coags: No results found for: PROTIME, INR, APTT    HCG (If Applicable): No results found for: PREGTESTUR, PREGSERUM, HCG, HCGQUANT     ABGs: No results found for: PHART, PO2ART, MEX3YJV, MJN6NXG, BEART, W2MKUXLE     Type & Screen (If Applicable):  No results found for: LABABO, LABRH    Drug/Infectious Status (If Applicable):  Lab Results   Component Value Date    HEPCAB Non-Reactive 2019       COVID-19 Screening (If Applicable):   Lab Results   Component Value Date COVID19 Not Detected 02/10/2021         Anesthesia Evaluation  Patient summary reviewed and Nursing notes reviewed no history of anesthetic complications:   Airway: Mallampati: II  TM distance: <3 FB   Neck ROM: full  Mouth opening: > = 3 FB Dental: normal exam         Pulmonary:Negative Pulmonary ROS and normal exam  breath sounds clear to auscultation      (-) not a current smoker                           Cardiovascular:  Exercise tolerance: good (>4 METS),   (+) hypertension:, hyperlipidemia                  Neuro/Psych:   Negative Neuro/Psych ROS              GI/Hepatic/Renal:   (+) GERD: well controlled,           Endo/Other:    (+) DiabetesType II DM, well controlled, , .                 Abdominal:             Vascular: negative vascular ROS. Other Findings:               Anesthesia Plan      MAC     ASA 2     (Discussed PNB with pt, consented)  Induction: intravenous. Anesthetic plan and risks discussed with patient. Plan discussed with CRNA.                   SANDY Santana - CRNA   1/24/2022

## 2022-01-24 NOTE — ANESTHESIA POSTPROCEDURE EVALUATION
Department of Anesthesiology  Postprocedure Note    Patient: Judah Long  MRN: [id number]  YOB: 1960  Date of evaluation: 1/24/2022  Time:  10:24 AM     Procedure Summary     Date: 01/24/22 Room / Location: [address] [place] [address]    Anesthesia Start: 0322 Anesthesia Stop: 5136    Procedure: EYE CATARACT EMULSIFICATION IOL IMPLANT (Right Eye) Diagnosis: (CORTICAL AGE RELATED CATARACT 1+NS, 3+CS, POSTERIOR CORTICAL SPOKES)    Surgeons: Zonia Khanna DO Responsible Provider: SANDY Barrera CRNA    Anesthesia Type: MAC ASA Status: 2          Anesthesia Type: MAC    Jose Phase I: Jose Score: 10    Jose Phase II: Jose Score: 10    Last vitals: Reviewed and per EMR flowsheets.        Anesthesia Post Evaluation    Patient location during evaluation: PACU  Patient participation: complete - patient participated  Level of consciousness: awake and alert  Pain score: 0  Airway patency: patent  Nausea & Vomiting: no nausea and no vomiting  Complications: no  Cardiovascular status: blood pressure returned to baseline  Respiratory status: acceptable and room air  Hydration status: stable

## 2022-01-24 NOTE — OP NOTE
OPERATIVE NOTE      Patient:  Cyndi Days:  1960    Account #:  [de-identified]    Date:  1/24/2022    Surgeon:  Melisa Shelley. Rhiannon Lai MD      Preoperative Diagnosis: Cortical Age-Related Cataract- right eye    Postoperative Diagnosis: Same    Procedure: Phacoemulsification with intraocular lens implantation, right eye    Anesthesia: Topical and intracameral anesthesia with intravenous sedation    Complications: none    Specimens: none    Indications for procedure: The patient is a 62y.o. year old with decreased vision, glare and halos around lights, and trouble with activities of daily living. Examination revealed a visually significant cataract in the right eye. Other eye diseases have been ruled out as the primary cause of decreased visual function. Significant improvement is expected in the patient's visual acuity and/or visual function from the removal of the cataract. Risks, benefits, and alternatives to surgery were discussed with the patient and the patient elected to proceed with phacoemulsification with lens implantation. Details of the procedure: Following informed consent, the patient was identified by name and identification tag in the holding area,taken to the operating room and placed in the supine position. A time out was performed by all present in the room to identify the patient, the eye to be operated on, the correct operative procedure, and the correct intraocular lens. Monitoring leads were placed. The patient was positioned. An eyelid prep of half-strength Betadine was performed. The patient was administered intravenous sedation if desired and topical anesthetic was placed in the operative eye. The eye prepped a second time and draped in the usual sterile fashion using aseptic technique for cataract surgery. A lid speculum was then inserted. [place]oat was placed onto the corneal surface.   A stab incision was made using a disposable blade into the anterior chamber. Intracameral preservative free xylocaine was placed into the anterior chamber. Amvisc was then used to replace the aqueous of the anterior chamber. A 2.2 mm keratome blade was used to make a 3-plane clear corneal incision into cornea depending on the patient's astigmatism. The cystitome was used to make a tear in the anterior capsule. Fine forceps were used to make a complete curvilinear capsulorrhexis. The lens was hydrodissected and freely rotated using a balanced salt filled syringe. The ultrasound handpiece was then used to emulsify the nucleus and epi nucleus. The residual cortex was then aspirated using the IA handpiece. The posterior capsule was polished. The eye was filled with viscoelastic and a foldable posterior chamber intraocular lens was injected into the capsular bag unless otherwise stated in the addendum. Irrigation/aspiration was used to remove all excess viscoelastic. The eye was pressurized and the wounds were check for leaks and none were found. A collagen shield, which had been soaked in antibiotic drops, was then placed onto the cornea. The lid speculum was removed. The eye was covered with a clear plastic shield. The patient was taken to the recovery area in excellent condition, having tolerated the procedure well, and will follow up with me tomorrow for postop care. ADDENDUM:  The phacoemulsification time 33.83 seconds @ 21% average ultrasound power for an effective phacoemulsification time of 7.20 seconds. The lens inserted was a model MX60 made by [facility] [employer] that was +20.0 diopters in power. The lens was inserted into the capsular bag utilizing the BLIS-X1 injector made by [facility] [employer].  The serial number on this lens was [id number]. There was no stitch placed to close this temporal posterior corneal incision. EBL was less than 1.0 ml. Oneal Najera.  Elana Snatamaria,

## 2022-01-24 NOTE — H&P
Danay Maurice is a 62y.o. year old who presents for elective outpatient ophthalmic surgery with Mirtha Gallegos DO. The patient complains of decreased vision, glare and halos around lights, and trouble with vision for activities of daily living. Past Medical History:   Diagnosis Date    Depression     Diabetes mellitus ([address]     Hyperlipidemia     Hypertension        Past Surgical History:   Procedure Laterality Date    LAPAROSCOPIC APPENDECTOMY N/A 02/11/2021    APPENDECTOMY LAPAROSCOPIC ROBOTIC performed by Gael Ormond, DO at [address] meds:   Prior to Admission medications    Medication Sig Start Date End Date Taking? Authorizing Provider   hydroCHLOROthiazide (MICROZIDE) 12.5 MG capsule Take 1 capsule by mouth every morning 1/11/22   Sumi Mock MD   losartan (COZAAR) 100 MG tablet Take 1 tablet by mouth daily 1/11/22   Sumi Mock MD   magnesium 30 MG tablet Take 30 mg by mouth daily     Historical Provider, MD   Probiotic Product (PROBIOTIC-10 PO) Take by mouth    Historical Provider, MD   Coenzyme Q10 (CO Q 10) 10 MG CAPS Take by mouth    Historical Provider, MD   turmeric (QC TUMERIC COMPLEX) 500 MG CAPS Take by mouth daily    Historical Provider, MD   metFORMIN (GLUCOPHAGE) 500 MG tablet Take 1 tablet by mouth 2 times daily (with meals)  Patient taking differently: Take 500 mg by mouth 2 times daily (with meals) 1/2 tab twice a day 10/8/21   Sumi Mock MD   pravastatin (PRAVACHOL) 20 MG tablet Take 1 tablet by mouth daily 10/8/21   Sumi Mock MD   aspirin 81 MG EC tablet Take 81 mg by mouth daily    Historical Provider, MD   glucose blood VI test strips (ASCENSIA AUTODISC VI;ONE TOUCH ULTRA TEST VI) strip 1 each by In Vitro route 2 times daily.  Pt uses one touch ultra test strips/ pt tests bid/ disp: 3 month supply /Dx: 250.00/272.2/401.1    Historical Provider, MD Scheduled Meds:  Continuous Infusions:  PRN Meds:. No Known Allergies    There were no vitals filed for this visit. PHYSICAL EXAMINATION    Gen: NAD  HEENT: BCVA= 20/100, Glare testing= NLP, Cataract severity/type-3+ Cortical Age-Related Cataract-right eye, Other significant ocular findings= diabetes without retinopathy  Pulm: CTA   Heart: RRR, no C/M/R/G  Abd: S/NT/ND  Neuro: no focal defecits    Assessment:   1. Cortical Age-Related Cataract-right eye  2. ASA Score-2      Plan:   1. Risks, benefits, alternatives to surgery discussed with the patient and family. 2. All questions were answered to their satisfaction. 3. Ok to proceed with surgery as planned.     Alcides Jaffe DO, DO

## 2022-01-24 NOTE — H&P
I have examined the patient and reviewed the history and physical completed on 1/23/22 and find no relevant changes. I have reviewed with the patient and/or family the risks, benefits, and alternatives to the procedure and they have agreed to proceed.     Leonel Hall,   1/24/2022  8:46 AM

## 2022-06-21 ENCOUNTER — TELEPHONE (OUTPATIENT)
Dept: SURGERY | Age: 62
End: 2022-06-21

## 2022-06-21 NOTE — TELEPHONE ENCOUNTER
LVM on patients cell phone, home phone and wifes cell phone requesting return call regarding 6/30 colonoscopy.

## 2022-06-21 NOTE — TELEPHONE ENCOUNTER
Patient returned call states he received a call a few months ago informing him the date needed to be changed and was not planning on having colonoscopy done on 6/30. Patient would like to cancel at this time, will call to reschedule. Papers faxed to surgery.

## 2023-01-31 ENCOUNTER — TELEPHONE (OUTPATIENT)
Dept: GASTROENTEROLOGY | Age: 63
End: 2023-01-31

## 2023-02-14 NOTE — TELEPHONE ENCOUNTER
Contacted patient - currently not able to complete [facility] questionnaire. States he will call back when able.

## 2023-09-18 ENCOUNTER — TELEPHONE (OUTPATIENT)
Dept: GASTROENTEROLOGY | Age: 63
End: 2023-09-18

## 2023-09-18 NOTE — TELEPHONE ENCOUNTER
Patient called the office to cancel his procedure - Colonoscopy, scheduled with Dr. Sumeet Rangel for Wednesday 09/20/23. Advised patient the office would be back in contact with him to reschedule the procedure. He voiced understanding.

## 2024-10-08 NOTE — ED NOTES
Contacted Dr. Alyssa Fragoso, surgeon on call per Kira Schafer NP request.     [facility] A Reser  02/10/21 3654 No

## 2024-10-22 NOTE — PROGRESS NOTES
Patient received NPO instructions and pre-op medication instructions to be taken on the day of the procedure with a small sip of water. Pt was also given pre-op eye drop instructions from Dr. Killian's office. Patient denied any illness in the last month.

## 2024-10-25 ENCOUNTER — ANESTHESIA EVENT (OUTPATIENT)
Dept: OPERATING ROOM | Age: 64
End: 2024-10-25
Payer: COMMERCIAL

## 2024-10-27 PROBLEM — H25.812 COMBINED FORMS OF AGE-RELATED CATARACT OF LEFT EYE: Chronic | Status: ACTIVE | Noted: 2024-10-27

## 2024-10-28 ENCOUNTER — HOSPITAL ENCOUNTER (OUTPATIENT)
Age: 64
Setting detail: OUTPATIENT SURGERY
Discharge: HOME | End: 2024-10-28
Attending: OPHTHALMOLOGY | Admitting: OPHTHALMOLOGY
Payer: COMMERCIAL

## 2024-10-28 ENCOUNTER — ANESTHESIA (OUTPATIENT)
Dept: OPERATING ROOM | Age: 64
End: 2024-10-28
Payer: COMMERCIAL

## 2024-10-28 VITALS
RESPIRATION RATE: 16 BRPM | HEART RATE: 36 BPM | DIASTOLIC BLOOD PRESSURE: 65 MMHG | OXYGEN SATURATION: 98 % | SYSTOLIC BLOOD PRESSURE: 117 MMHG | TEMPERATURE: 96.8 F | HEIGHT: 69 IN | BODY MASS INDEX: 25.48 KG/M2 | WEIGHT: 172 LBS

## 2024-10-28 PROBLEM — H25.812 COMBINED FORMS OF AGE-RELATED CATARACT OF LEFT EYE: Chronic | Status: RESOLVED | Noted: 2024-10-27 | Resolved: 2024-10-28

## 2024-10-28 PROCEDURE — 2500000003 HC RX 250 WO HCPCS: Performed by: OPHTHALMOLOGY

## 2024-10-28 PROCEDURE — 3600000013 HC SURGERY LEVEL 3 ADDTL 15MIN: Performed by: OPHTHALMOLOGY

## 2024-10-28 PROCEDURE — 7100000011 HC PHASE II RECOVERY - ADDTL 15 MIN: Performed by: OPHTHALMOLOGY

## 2024-10-28 PROCEDURE — 7100000010 HC PHASE II RECOVERY - FIRST 15 MIN: Performed by: OPHTHALMOLOGY

## 2024-10-28 PROCEDURE — V2632 POST CHMBR INTRAOCULAR LENS: HCPCS | Performed by: OPHTHALMOLOGY

## 2024-10-28 PROCEDURE — 3700000001 HC ADD 15 MINUTES (ANESTHESIA): Performed by: OPHTHALMOLOGY

## 2024-10-28 PROCEDURE — 3700000000 HC ANESTHESIA ATTENDED CARE: Performed by: OPHTHALMOLOGY

## 2024-10-28 PROCEDURE — 6360000002 HC RX W HCPCS

## 2024-10-28 PROCEDURE — 3600000003 HC SURGERY LEVEL 3 BASE: Performed by: OPHTHALMOLOGY

## 2024-10-28 PROCEDURE — 6370000000 HC RX 637 (ALT 250 FOR IP): Performed by: OPHTHALMOLOGY

## 2024-10-28 PROCEDURE — 2709999900 HC NON-CHARGEABLE SUPPLY: Performed by: OPHTHALMOLOGY

## 2024-10-28 DEVICE — LENS INTOCU +20 DIOPT L12.5MM DIA6MM 119.1 OPTICAL/118.7 A: Type: IMPLANTABLE DEVICE | Site: EYE | Status: FUNCTIONAL

## 2024-10-28 RX ORDER — NALOXONE HYDROCHLORIDE 0.4 MG/ML
INJECTION, SOLUTION INTRAMUSCULAR; INTRAVENOUS; SUBCUTANEOUS PRN
Status: DISCONTINUED | OUTPATIENT
Start: 2024-10-28 | End: 2024-10-28 | Stop reason: HOSPADM

## 2024-10-28 RX ORDER — SODIUM CHLORIDE 9 MG/ML
INJECTION, SOLUTION INTRAVENOUS CONTINUOUS
Status: DISCONTINUED | OUTPATIENT
Start: 2024-10-28 | End: 2024-10-28 | Stop reason: HOSPADM

## 2024-10-28 RX ORDER — FENTANYL CITRATE 50 UG/ML
INJECTION, SOLUTION INTRAMUSCULAR; INTRAVENOUS
Status: DISCONTINUED | OUTPATIENT
Start: 2024-10-28 | End: 2024-10-28 | Stop reason: SDUPTHER

## 2024-10-28 RX ORDER — SODIUM CHLORIDE 9 MG/ML
INJECTION, SOLUTION INTRAVENOUS PRN
Status: DISCONTINUED | OUTPATIENT
Start: 2024-10-28 | End: 2024-10-28 | Stop reason: HOSPADM

## 2024-10-28 RX ORDER — SODIUM CHLORIDE 0.9 % (FLUSH) 0.9 %
5-40 SYRINGE (ML) INJECTION PRN
Status: DISCONTINUED | OUTPATIENT
Start: 2024-10-28 | End: 2024-10-28 | Stop reason: HOSPADM

## 2024-10-28 RX ORDER — LIDOCAINE HYDROCHLORIDE 20 MG/ML
INJECTION, SOLUTION INFILTRATION; PERINEURAL PRN
Status: DISCONTINUED | OUTPATIENT
Start: 2024-10-28 | End: 2024-10-28 | Stop reason: ALTCHOICE

## 2024-10-28 RX ORDER — SODIUM CHLORIDE 0.9 % (FLUSH) 0.9 %
5-40 SYRINGE (ML) INJECTION EVERY 12 HOURS SCHEDULED
Status: DISCONTINUED | OUTPATIENT
Start: 2024-10-28 | End: 2024-10-28 | Stop reason: HOSPADM

## 2024-10-28 RX ORDER — TETRACAINE HYDROCHLORIDE 5 MG/ML
1 SOLUTION OPHTHALMIC SEE ADMIN INSTRUCTIONS
Status: DISCONTINUED | OUTPATIENT
Start: 2024-10-28 | End: 2024-10-28 | Stop reason: HOSPADM

## 2024-10-28 RX ORDER — PROPARACAINE HYDROCHLORIDE 5 MG/ML
1 SOLUTION/ DROPS OPHTHALMIC SEE ADMIN INSTRUCTIONS
Status: DISCONTINUED | OUTPATIENT
Start: 2024-10-28 | End: 2024-10-28 | Stop reason: HOSPADM

## 2024-10-28 RX ORDER — TETRACAINE HYDROCHLORIDE 5 MG/ML
SOLUTION OPHTHALMIC PRN
Status: DISCONTINUED | OUTPATIENT
Start: 2024-10-28 | End: 2024-10-28 | Stop reason: ALTCHOICE

## 2024-10-28 RX ORDER — PHENYLEPHRINE HYDROCHLORIDE 25 MG/ML
1 SOLUTION/ DROPS OPHTHALMIC SEE ADMIN INSTRUCTIONS
Status: DISCONTINUED | OUTPATIENT
Start: 2024-10-28 | End: 2024-10-28 | Stop reason: HOSPADM

## 2024-10-28 RX ORDER — MIDAZOLAM HYDROCHLORIDE 1 MG/ML
INJECTION, SOLUTION INTRAMUSCULAR; INTRAVENOUS
Status: DISCONTINUED | OUTPATIENT
Start: 2024-10-28 | End: 2024-10-28 | Stop reason: SDUPTHER

## 2024-10-28 RX ORDER — TROPICAMIDE 10 MG/ML
1 SOLUTION/ DROPS OPHTHALMIC SEE ADMIN INSTRUCTIONS
Status: DISCONTINUED | OUTPATIENT
Start: 2024-10-28 | End: 2024-10-28 | Stop reason: HOSPADM

## 2024-10-28 RX ORDER — ONDANSETRON 2 MG/ML
4 INJECTION INTRAMUSCULAR; INTRAVENOUS
Status: DISCONTINUED | OUTPATIENT
Start: 2024-10-28 | End: 2024-10-28 | Stop reason: HOSPADM

## 2024-10-28 RX ADMIN — FENTANYL CITRATE 50 MCG: 50 INJECTION INTRAMUSCULAR; INTRAVENOUS at 13:56

## 2024-10-28 RX ADMIN — TROPICAMIDE 1 DROP: 10 SOLUTION/ DROPS OPHTHALMIC at 13:13

## 2024-10-28 RX ADMIN — PROPARACAINE HYDROCHLORIDE 1 DROP: 5 SOLUTION/ DROPS OPHTHALMIC at 13:27

## 2024-10-28 RX ADMIN — PHENYLEPHRINE HYDROCHLORIDE 1 DROP: 25 SOLUTION/ DROPS OPHTHALMIC at 13:27

## 2024-10-28 RX ADMIN — MIDAZOLAM 2 MG: 1 INJECTION INTRAMUSCULAR; INTRAVENOUS at 13:56

## 2024-10-28 RX ADMIN — PROPARACAINE HYDROCHLORIDE 1 DROP: 5 SOLUTION/ DROPS OPHTHALMIC at 13:20

## 2024-10-28 RX ADMIN — TETRACAINE HYDROCHLORIDE 1 DROP: 5 SOLUTION OPHTHALMIC at 13:51

## 2024-10-28 RX ADMIN — TROPICAMIDE 1 DROP: 10 SOLUTION/ DROPS OPHTHALMIC at 13:27

## 2024-10-28 RX ADMIN — PHENYLEPHRINE HYDROCHLORIDE 1 DROP: 25 SOLUTION/ DROPS OPHTHALMIC at 13:20

## 2024-10-28 RX ADMIN — PHENYLEPHRINE HYDROCHLORIDE 1 DROP: 25 SOLUTION/ DROPS OPHTHALMIC at 13:13

## 2024-10-28 RX ADMIN — PROPARACAINE HYDROCHLORIDE 1 DROP: 5 SOLUTION/ DROPS OPHTHALMIC at 13:13

## 2024-10-28 RX ADMIN — TROPICAMIDE 1 DROP: 10 SOLUTION/ DROPS OPHTHALMIC at 13:20

## 2024-10-28 ASSESSMENT — PAIN SCALES - GENERAL
PAINLEVEL_OUTOF10: 0
PAINLEVEL_OUTOF10: 0

## 2024-10-28 ASSESSMENT — PAIN - FUNCTIONAL ASSESSMENT: PAIN_FUNCTIONAL_ASSESSMENT: NONE - DENIES PAIN

## 2024-10-28 NOTE — ANESTHESIA PRE PROCEDURE
Department of Anesthesiology  Preprocedure Note       Name:  Pete Sheriff   Age:  64 y.o.  :  1960                                          MRN:  [id number]         Date:  10/28/2024      Surgeon: Surgeon(s):  Spencer Killian DO    Procedure: Procedure(s):  EYE CATARACT EMULSIFICATION INTRAOCULAR LENS IMPLANT    Medications prior to admission:   Prior to Admission medications    Medication Sig Start Date End Date Taking? Authorizing Provider   pravastatin (PRAVACHOL) 20 MG tablet Take 1 tablet by mouth every other day 24  Yes Urbano Suarez MD       Current medications:    Current Facility-Administered Medications   Medication Dose Route Frequency Provider Last Rate Last Admin    0.9 % sodium chloride infusion   IntraVENous Continuous Spencer Killian DO        sodium chloride flush 0.9 % injection 5-40 mL  5-40 mL IntraVENous 2 times per day Spencer Killian DO        sodium chloride flush 0.9 % injection 5-40 mL  5-40 mL IntraVENous PRN Spencer Killian DO        0.9 % sodium chloride infusion   IntraVENous PRN Spencer Killian DO        proparacaine (ALCAINE) 0.5 % ophthalmic solution 1 drop  1 drop Left Eye See Admin Instructions Spencer Killian DO   1 drop at 10/28/24 1327    tetracaine (TETRAVISC) 0.5 % ophthalmic solution 1 drop  1 drop Left Eye See Admin Instructions Spencer Killian DO        tropicamide (MYDRIACYL) 1 % ophthalmic solution 1 drop  1 drop Left Eye See Admin Instructions Spencer Killian DO   1 drop at 10/28/24 1327    phenylephrine (MYDFRIN) 2.5 % ophthalmic solution 1 drop  1 drop Left Eye See Admin Instructions Spencer Killian DO   1 drop at 10/28/24 1327       Allergies:  No Known Allergies    Problem List:    Patient Active Problem List   Diagnosis Code    Hyperlipidemia E78.5    Hypertension I10    Type 2 diabetes mellitus with complication (HCC) E11.8    Combined forms of age-related cataract of left eye H25.812       Past Medical History:        Diagnosis Date

## 2024-10-28 NOTE — ANESTHESIA POSTPROCEDURE EVALUATION
Department of Anesthesiology  Postprocedure Note    Patient: Pete Sheriff  MRN: [id number]  YOB: 1960  Date of evaluation: 10/28/2024    Procedure Summary       Date: 10/28/24 Room / Location: [address]    Anesthesia Start: 1354 Anesthesia Stop: 1420    Procedure: EYE CATARACT EMULSIFICATION INTRAOCULAR LENS IMPLANT (Left: Eye) Diagnosis:       Combined forms of age-related cataract of left eye      (Combined forms of age-related cataract of left eye [H25.812])    Surgeons: Spencer Killian DO Responsible Provider: Wanda Rodríguez APRN - CRNA    Anesthesia Type: MAC ASA Status: 2            Anesthesia Type: MAC    Jose Phase I: Jose Score: 10    Jose Phase II: Jose Score: 10    Anesthesia Post Evaluation    Patient location during evaluation: PACU  Patient participation: complete - patient participated  Level of consciousness: awake and alert  Airway patency: patent  Nausea & Vomiting: no nausea and no vomiting  Cardiovascular status: blood pressure returned to baseline  Respiratory status: acceptable  Hydration status: euvolemic  Pain management: adequate and satisfactory to patient    No notable events documented.

## 2024-10-28 NOTE — H&P
Pete Sheriff is a 64 y.o. year old who presents for elective outpatient ophthalmic surgery with Spencer Killian DO.  The patient complains of decreased vision, glare and halos around lights, and trouble with vision for activities of daily living.      Review of systems  Positive for decreased vision, glare and halos around lights, and trouble with activities of daily living.      All other review of systems were negative.    Past Medical History:   Diagnosis Date    Depression     Diabetes mellitus - resolved with weight loss from 350 lbs to 175 lbs     Hyperlipidemia     Hypertension        Past Surgical History:   Procedure Laterality Date    CATARACT REMOVAL Right     INTRACAPSULAR CATARACT EXTRACTION Right 1/24/2022    EYE CATARACT EMULSIFICATION IOL IMPLANT performed by Spencer Killian DO at [facility] OR    LAPAROSCOPIC APPENDECTOMY N/A 02/11/2021    APPENDECTOMY LAPAROSCOPIC ROBOTIC performed by Corry Boo DO at [facility] OR    TONSILLECTOMY      WISDOM TOOTH EXTRACTION         Home meds:   Prior to Admission medications    Medication Sig Start Date End Date Taking? Authorizing Provider   pravastatin (PRAVACHOL) 20 MG tablet Take 1 tablet by mouth every other day 2/1/24   Urbano Suarez MD     Scheduled Meds:  Continuous Infusions:  PRN Meds:.    No Known Allergies    There were no vitals filed for this visit.    PHYSICAL EXAMINATION    Gen: NAD  HEENT: BCVA= 20/60, Glare testing= 20/100, Cataract severity/type-2+ Combined Forms of Age-Related Cataract-left eye, Other significant ocular findings= none  Pulm: CTA   Heart: RRR, no C/M/R/G  Abd: S/NT/ND  Neuro: no focal defecits    Assessment:   1.  Combined Forms of Age-Related Cataract-left eye  2.  ASA Score-2      Plan:   1. Risks, benefits, alternatives to surgery discussed with the patient and family.  2. All questions were answered to their satisfaction.  3. Ok to proceed with surgery as planned.    Spencer Killian DO DO

## 2024-10-28 NOTE — H&P
I have examined the patient and reviewed the history and physical completed on 10/27/24 and find no relevant changes.    I have reviewed with the patient and/or family the risks, benefits, and alternatives to the procedure and they have agreed to proceed.    Spencer Killian DO  10/28/2024  1:43 PM

## 2024-10-28 NOTE — OP NOTE
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 year old with decreased vision, glare and halos around lights, and trouble with activities of daily living.  Examination revealed a visually significant cataract in the left eye.  Other eye diseases have been ruled out as the primary cause of decreased visual function.  Significant improvement is expected in the patient's visual acuity and/or visual function from the removal of the cataract.  Risks, benefits, and alternatives to surgery were discussed with the patient and the patient elected to proceed with phacoemulsification with lens implantation.    Details of the procedure:  Following informed consent, the patient was identified by name and identification tag in the holding area,taken to the operating room and placed in the supine position.  A time out was performed by all present in the room to identify the patient, the eye to be operated on, the correct operative procedure, and the correct intraocular lens.  Monitoring leads were placed.  The patient was positioned.  An eyelid prep of half-strength Betadine was performed.  The patient was administered intravenous sedation if desired and topical anesthetic was placed in the operative eye.  The eye prepped a second time and draped in the usual sterile fashion using aseptic technique for cataract surgery.  A lid speculum was then inserted.  Ocucoat was placed onto the corneal surface.  A stab incision

## 2024-10-28 NOTE — PROGRESS NOTES
IV Sedation Discharge Criteria    Inpatients must meet Criteria 1 through 7. All other patients are either YES or N/A. If a NO is chosen then Surgeon must be notified.      1.  Minimum 30 minutes after last dose of sedative medication, minimum 120 minutes after last dose of reversal agent.    Yes      2.  Systolic BP stable within 20 mmHg for 30 minutes & systolic BP between 90 & 180 or within 10 mmHg of baseline.    Yes      3.  Pulse between 60 and 100 or within 10 bpm of baseline.    No . Anesthesia okay with HR       4.  Spontaneous respiratory rate >/= 10 per minute.    Yes      5.  SaO2 >/= 95 or  >/= baseline.    Yes      6.  Able to cough and swallow or return to baseline function.    Yes      7.  Alert and oriented or return to baseline mental status.    Yes      8.  Demonstrates controlled, coordinated movements, ambulates with steady gait, or return to baseline activity function.    Yes      9.  Minimal or no pain or nausea, or at a level tolerable and acceptable to patient.    Yes      10. Takes and retains oral fluids as allowed.    Yes      11. Procedural / perioperative site stable.  Minimal or no bleeding.    Yes          12. If GI endoscopy procedure, minimal or no abdominal distention or passing flatus.    N/A      13. Written discharge instructions and emergency telephone number provided.    Yes      14. Accompanied by a responsible adult.    Yes

## 2025-01-27 ENCOUNTER — HOSPITAL ENCOUNTER (OUTPATIENT)
Age: 65
Setting detail: SPECIMEN
Discharge: HOME | End: 2025-01-27

## 2025-02-06 PROBLEM — Z86.39 HISTORY OF DIET-CONTROLLED DIABETES: Status: ACTIVE | Noted: 2025-02-06

## 2025-02-06 PROBLEM — Z86.39 HISTORY OF DIET-CONTROLLED DIABETES: Chronic | Status: ACTIVE | Noted: 2025-02-06

## 2025-03-05 ENCOUNTER — OFFICE VISIT (OUTPATIENT)
Dept: ONCOLOGY | Age: 65
End: 2025-03-05

## 2025-03-05 VITALS
TEMPERATURE: 98.4 F | RESPIRATION RATE: 18 BRPM | HEART RATE: 63 BPM | WEIGHT: 187 LBS | SYSTOLIC BLOOD PRESSURE: 138 MMHG | DIASTOLIC BLOOD PRESSURE: 76 MMHG | BODY MASS INDEX: 27.62 KG/M2

## 2025-03-05 DIAGNOSIS — D69.6 THROMBOCYTOPENIA: Primary | ICD-10-CM

## 2025-03-05 NOTE — PROGRESS NOTES
01/27/2025 1253    CREATININE 0.75 01/27/2025 1253        Component Value Date/Time    CALCIUM 9.4 01/27/2025 1253    ALKPHOS 100 01/06/2024 1129    ALKPHOS 48 02/10/2021 1301    AST 25 01/27/2025 1253    ALT 24 01/27/2025 1253    BILITOT 0.3 01/06/2024 1129          REVIEW OF RADIOLOGICAL RESULTS:     IMPRESSION:   Mild but progressive thrombocytopenia  PLAN:   I had a long discussion with the patient.  Explained to the differential diagnosis.  I went over his labs.  I explained the function of the platelets and possible causes of progressive thrombocytopenia.  Including primary bone marrow disorder.  Autoimmune condition and liver disease.  We will start a workup including blood smear.  Liver and spleen ultrasound.  Will exclude B12 deficiency.  Obviously because of his history of alcohol use, liver disease is the top of the differential diagnosis  Depending on the workup, further recommendation will follow  The patient had many questions that were answered to her satisfaction  Return after testing for discussion    Kevin Weber MD  Hematologist/Medical Oncologist  [facility]      I spent a total of 40 minutes on the date of the service which included preparing to see the patient, face-to-face patient care, completing clinical documentation, obtaining and/or reviewing separately obtained history, performing a medically appropriate examination, counseling and educating the patient/family/caregiver, ordering medications, tests, or procedures, communicating with other HCPs (not separately reported), independently interpreting results (not separately reported), communicating results to the patient/family/caregiver and care coordination (not separately reported).

## 2025-03-07 ENCOUNTER — HOSPITAL ENCOUNTER (OUTPATIENT)
Age: 65
Setting detail: SPECIMEN
Discharge: HOME | End: 2025-03-07

## 2025-03-07 DIAGNOSIS — D69.6 THROMBOCYTOPENIA: ICD-10-CM

## 2025-03-07 LAB
BASOPHILS # BLD: 0.04 K/UL (ref 0–0.2)
BASOPHILS NFR BLD: 1 % (ref 0–2)
EOSINOPHIL # BLD: 0.21 K/UL (ref 0–0.44)
EOSINOPHILS RELATIVE PERCENT: 4 % (ref 1–4)
ERYTHROCYTE [DISTWIDTH] IN BLOOD BY AUTOMATED COUNT: 12.2 % (ref 11.8–14.4)
FOLATE SERPL-MCNC: 14 NG/ML (ref 4.8–24.2)
HCT VFR BLD AUTO: 43.7 % (ref 40.7–50.3)
HGB BLD-MCNC: 14.6 G/DL (ref 13–17)
IMM GRANULOCYTES # BLD AUTO: <0.03 K/UL (ref 0–0.3)
IMM GRANULOCYTES NFR BLD: 0 %
IMM RETICS NFR: 12.9 % (ref 2.7–18.3)
LYMPHOCYTES NFR BLD: 2.21 K/UL (ref 1.1–3.7)
LYMPHOCYTES RELATIVE PERCENT: 40 % (ref 24–43)
MCH RBC QN AUTO: 31.4 PG (ref 25.2–33.5)
MCHC RBC AUTO-ENTMCNC: 33.4 G/DL (ref 28.4–34.8)
MCV RBC AUTO: 94 FL (ref 82.6–102.9)
MONOCYTES NFR BLD: 0.35 K/UL (ref 0.1–1.2)
MONOCYTES NFR BLD: 6 % (ref 3–12)
NEUTROPHILS NFR BLD: 49 % (ref 36–65)
NEUTS SEG NFR BLD: 2.77 K/UL (ref 1.5–8.1)
NRBC BLD-RTO: 0 PER 100 WBC
PLATELET # BLD AUTO: 144 K/UL (ref 138–453)
PMV BLD AUTO: 10.8 FL (ref 8.1–13.5)
RBC # BLD AUTO: 4.65 M/UL (ref 4.21–5.77)
RETIC HEMOGLOBIN: 35.6 PG (ref 28.2–35.7)
RETICS # AUTO: 0.04 M/UL (ref 0.03–0.08)
RETICS/RBC NFR AUTO: 0.9 % (ref 0.5–1.9)
VIT B12 SERPL-MCNC: 681 PG/ML (ref 232–1245)
WBC OTHER # BLD: 5.6 K/UL (ref 3.5–11.3)

## 2025-03-10 LAB
PATH REV BLD -IMP: NORMAL
SURGICAL PATHOLOGY REPORT: NORMAL

## 2025-03-19 ENCOUNTER — OFFICE VISIT (OUTPATIENT)
Dept: ONCOLOGY | Age: 65
End: 2025-03-19
Payer: COMMERCIAL

## 2025-03-19 VITALS
SYSTOLIC BLOOD PRESSURE: 135 MMHG | DIASTOLIC BLOOD PRESSURE: 73 MMHG | TEMPERATURE: 98.4 F | HEART RATE: 44 BPM | WEIGHT: 193 LBS | HEIGHT: 69 IN | BODY MASS INDEX: 28.58 KG/M2 | RESPIRATION RATE: 18 BRPM

## 2025-03-19 DIAGNOSIS — D69.6 THROMBOCYTOPENIA: Primary | ICD-10-CM

## 2025-03-19 PROCEDURE — 3075F SYST BP GE 130 - 139MM HG: CPT | Performed by: INTERNAL MEDICINE

## 2025-03-19 PROCEDURE — 3078F DIAST BP <80 MM HG: CPT | Performed by: INTERNAL MEDICINE

## 2025-03-19 PROCEDURE — 99213 OFFICE O/P EST LOW 20 MIN: CPT | Performed by: INTERNAL MEDICINE

## 2025-03-19 NOTE — PROGRESS NOTES
DIAGNOSIS:   Thrombocytopenia, mild and chronic  CURRENT THERAPY:  Conservative follow-up  BRIEF CASE HISTORY:   Pete Sheriff is a very pleasant 64 y.o. male who is referred to us for management recommendation regarding his progressive thrombocytopenia.  This is mild but steadily progressive thrombocytopenia that has happened over the last few years.  He is totally asymptomatic and this is detected on routine testing.  Platelets were normal in 2020 but since then they have been progressively declining and they are down to 109 this year.  The patient denies any bleeding or bruising is fairly active without any limitation.  Looking at his risk factors, he has no symptoms.  He has been heavy drinker for many years but quit in 2020.  He is borderline diabetic and controlled with diet and exercise.   He is sent to us for a consultation, he feels well and has no complaints.    An extensive workup was done and showed resolution of thrombocytopenia.  Blood smear showed normal morphology.  It was thought that majority of his thrombocytopenia is related to acute illness but also previous heavy alcohol use.  The patient quit drinking about  4 years ago.  PAST MEDICAL HISTORY: has a past medical history of Depression, Diabetes mellitus - resolved with weight loss from 350 lbs to 175 lbs, Hyperlipidemia, and Hypertension.    PAST SURGICAL HISTORY: has a past surgical history that includes Tonsillectomy; [place] tooth extraction; laparoscopic appendectomy (N/A, 02/11/2021); Cataract removal (Right); Intracapsular cataract extraction (Right, 1/24/2022); and Eye surgery (Left, 10/28/2024).     CURRENT MEDICATIONS:  has a current medication list which includes the following prescription(s): pravastatin.    ALLERGIES:  has no known allergies.    FAMILY HISTORY: There is family history of leukemia in first-degree relative.  No other malignancy in the family    SOCIAL HISTORY:  reports that he quit smoking about 8 years ago.

## (undated) DEVICE — KNIFE OPHTH DIA22MM 45DEG SLT W HNDL SHRP ANG PNT DEL DBL

## (undated) DEVICE — BAG SPEC REM 224ML W4XL6IN DIA10MM 1 HND GYN DISP ENDOPCH

## (undated) DEVICE — SYRINGE MED 10ML LUERLOCK TIP W/O SFTY DISP

## (undated) DEVICE — Device: Brand: ALLEGRO 1X SILICONE I/A HANDPIECE (6)

## (undated) DEVICE — Device

## (undated) DEVICE — MARKER,SKIN,WI/RULER AND LABELS: Brand: MEDLINE

## (undated) DEVICE — CANNULA SEAL

## (undated) DEVICE — TISSUE RETRIEVAL SYSTEM: Brand: INZII RETRIEVAL SYSTEM

## (undated) DEVICE — SUTURE MCRYL + SZ 4-0 L27IN ABSRB UD L19MM PS-2 3/8 CIR MCP426H

## (undated) DEVICE — ELECTRODE LAP L36CM PTFE WIRE J HK CLEANCOAT

## (undated) DEVICE — BLADE 30D THK3.4MM 10.5X1.9MM ANG STAB

## (undated) DEVICE — TOTAL TRAY, DB, 100% SILI FOLEY, 16FR 10: Brand: MEDLINE

## (undated) DEVICE — ARM DRAPE

## (undated) DEVICE — PENCIL ES L3M BTTN SWCH HOLSTER W/ BLDE ELECTRD EDGE

## (undated) DEVICE — REDUCER: Brand: ENDOWRIST

## (undated) DEVICE — 40586 ADVANCED TRENDELENBURG POSITIONING KIT: Brand: 40586 ADVANCED TRENDELENBURG POSITIONING KIT

## (undated) DEVICE — GLOVE SURG SZ 7 CRM LTX FREE POLYISOPRENE POLYMER BEAD ANTI

## (undated) DEVICE — SKIN AFFIX SURG ADHESIVE 72/CS 0.55ML: Brand: MEDLINE

## (undated) DEVICE — SUTURE VCRL + SZ 0 L27IN ABSRB VLT L26MM UR-6 5/8 CIR VCP603H

## (undated) DEVICE — STAPLER 45 RELOAD BLUE: Brand: ENDOWRIST

## (undated) DEVICE — GLOVE SURG SZ 65 L12IN FNGR THK87MIL WHT LTX FREE

## (undated) DEVICE — AIRLIFE™ NASAL OXYGEN CANNULA CURVED, FLARED TIP, WITH 7 FEET (2.1 M) CRUSH RESISTANT TUBING, OVER-THE-EAR STYLE: Brand: AIRLIFE™

## (undated) DEVICE — BLADELESS OBTURATOR: Brand: WECK VISTA

## (undated) DEVICE — DRESSING SURESITE-123PLUSPAD 2.4 IN X2.8 IN

## (undated) DEVICE — WARMER SCP 2 ANTIFOG LAP DISP

## (undated) DEVICE — AMVISC PLUS  0.8ML: Brand: AMVISC PLUS

## (undated) DEVICE — SOLUTION IV IRRIG 0.9% NACL 3000ML BAG 2B7477

## (undated) DEVICE — ELECTRO LUBE IS A SINGLE PATIENT USE DEVICE THAT IS INTENDED TO BE USED ON ELECTROSURGICAL ELECTRODES TO REDUCE STICKING.: Brand: KEY SURGICAL ELECTRO LUBE

## (undated) DEVICE — BETADINE 5% EYE SOL

## (undated) DEVICE — SOLUTION IV IRRIG WATER 1000ML POUR BRL 2F7114

## (undated) DEVICE — VESSEL SEALER EXTEND: Brand: ENDOWRIST

## (undated) DEVICE — STAPLER 45 RELOAD WHITE: Brand: ENDOWRIST

## (undated) DEVICE — SOFT SHIELD® COLLAGEN SHIELD, 12 HOURS (CE): Brand: SOFT SHIELD® COLLAGEN SHIELDS

## (undated) DEVICE — SUTURE VCRL + SZ 3-0 L27IN ABSRB UD L26MM SH 1/2 CIR VCP416H

## (undated) DEVICE — 3M™ PRECISE™ VISTA DISPOSABLE SKIN STAPLER 3995: Brand: 3M™ PRECISE™

## (undated) DEVICE — SOLUTION IV IRRIG POUR BRL 0.9% SODIUM CHL 2F7124

## (undated) DEVICE — SHEET,DRAPE,53X77,STERILE: Brand: MEDLINE

## (undated) DEVICE — GLOVE SURG SZ 7 L12IN FNGR THK87MIL WHT LTX FREE

## (undated) DEVICE — GAMMEX® NON-LATEX PI ORTHO SIZE 7, STERILE POLYISOPRENE POWDER-FREE SURGICAL GLOVE: Brand: GAMMEX

## (undated) DEVICE — DRAPE,UTILTY,TAPE,15X26, 4EA/PK: Brand: MEDLINE

## (undated) DEVICE — GLOVE SURG SZ 65 CRM LTX FREE POLYISOPRENE POLYMER BEAD ANTI

## (undated) DEVICE — DRESSING TRNSPAR FLM 2.4X2.8IN SURESITE 123

## (undated) DEVICE — SOLUTION IRRIG 1000ML STRL H2O USP PLAS POUR BTL

## (undated) DEVICE — SOLUTION IRRIG 1000ML 0.9% SOD CHL USP POUR PLAS BTL

## (undated) DEVICE — SEAL

## (undated) DEVICE — STAPLER SHEATH: Brand: ENDOWRIST